# Patient Record
Sex: FEMALE | Race: WHITE | NOT HISPANIC OR LATINO | URBAN - METROPOLITAN AREA
[De-identification: names, ages, dates, MRNs, and addresses within clinical notes are randomized per-mention and may not be internally consistent; named-entity substitution may affect disease eponyms.]

---

## 2017-12-07 ENCOUNTER — OUTPATIENT (OUTPATIENT)
Dept: OUTPATIENT SERVICES | Facility: HOSPITAL | Age: 77
LOS: 1 days | End: 2017-12-07
Payer: MEDICARE

## 2017-12-07 VITALS
TEMPERATURE: 98 F | DIASTOLIC BLOOD PRESSURE: 79 MMHG | HEART RATE: 81 BPM | SYSTOLIC BLOOD PRESSURE: 135 MMHG | RESPIRATION RATE: 16 BRPM | WEIGHT: 121.92 LBS

## 2017-12-07 DIAGNOSIS — M17.11 UNILATERAL PRIMARY OSTEOARTHRITIS, RIGHT KNEE: ICD-10-CM

## 2017-12-07 DIAGNOSIS — Z98.890 OTHER SPECIFIED POSTPROCEDURAL STATES: Chronic | ICD-10-CM

## 2017-12-07 DIAGNOSIS — Z01.818 ENCOUNTER FOR OTHER PREPROCEDURAL EXAMINATION: ICD-10-CM

## 2017-12-07 DIAGNOSIS — Z90.5 ACQUIRED ABSENCE OF KIDNEY: Chronic | ICD-10-CM

## 2017-12-07 DIAGNOSIS — Z98.89 OTHER SPECIFIED POSTPROCEDURAL STATES: Chronic | ICD-10-CM

## 2017-12-07 LAB
ALBUMIN SERPL ELPH-MCNC: 3.7 G/DL — SIGNIFICANT CHANGE UP (ref 3.3–5)
ALP SERPL-CCNC: 148 U/L — HIGH (ref 40–120)
ALT FLD-CCNC: 40 U/L — SIGNIFICANT CHANGE UP (ref 12–78)
ANION GAP SERPL CALC-SCNC: 7 MMOL/L — SIGNIFICANT CHANGE UP (ref 5–17)
APTT BLD: 33.3 SEC — SIGNIFICANT CHANGE UP (ref 27.5–37.4)
AST SERPL-CCNC: 19 U/L — SIGNIFICANT CHANGE UP (ref 15–37)
BILIRUB SERPL-MCNC: 0.3 MG/DL — SIGNIFICANT CHANGE UP (ref 0.2–1.2)
BUN SERPL-MCNC: 34 MG/DL — HIGH (ref 7–23)
CALCIUM SERPL-MCNC: 9.7 MG/DL — SIGNIFICANT CHANGE UP (ref 8.5–10.1)
CHLORIDE SERPL-SCNC: 107 MMOL/L — SIGNIFICANT CHANGE UP (ref 96–108)
CO2 SERPL-SCNC: 26 MMOL/L — SIGNIFICANT CHANGE UP (ref 22–31)
CREAT SERPL-MCNC: 1.3 MG/DL — SIGNIFICANT CHANGE UP (ref 0.5–1.3)
GLUCOSE SERPL-MCNC: 93 MG/DL — SIGNIFICANT CHANGE UP (ref 70–99)
HCT VFR BLD CALC: 32.7 % — LOW (ref 34.5–45)
HGB BLD-MCNC: 10 G/DL — LOW (ref 11.5–15.5)
INR BLD: 1.1 RATIO — SIGNIFICANT CHANGE UP (ref 0.88–1.16)
MCHC RBC-ENTMCNC: 27.5 PG — SIGNIFICANT CHANGE UP (ref 27–34)
MCHC RBC-ENTMCNC: 30.7 GM/DL — LOW (ref 32–36)
MCV RBC AUTO: 89.7 FL — SIGNIFICANT CHANGE UP (ref 80–100)
PLATELET # BLD AUTO: 429 K/UL — HIGH (ref 150–400)
POTASSIUM SERPL-MCNC: 4.9 MMOL/L — SIGNIFICANT CHANGE UP (ref 3.5–5.3)
POTASSIUM SERPL-SCNC: 4.9 MMOL/L — SIGNIFICANT CHANGE UP (ref 3.5–5.3)
PROT SERPL-MCNC: 8.2 G/DL — SIGNIFICANT CHANGE UP (ref 6–8.3)
PROTHROM AB SERPL-ACNC: 12 SEC — SIGNIFICANT CHANGE UP (ref 9.8–12.7)
RBC # BLD: 3.65 M/UL — LOW (ref 3.8–5.2)
RBC # FLD: 13.3 % — SIGNIFICANT CHANGE UP (ref 10.3–14.5)
SODIUM SERPL-SCNC: 140 MMOL/L — SIGNIFICANT CHANGE UP (ref 135–145)
WBC # BLD: 9.5 K/UL — SIGNIFICANT CHANGE UP (ref 3.8–10.5)
WBC # FLD AUTO: 9.5 K/UL — SIGNIFICANT CHANGE UP (ref 3.8–10.5)

## 2017-12-07 PROCEDURE — 93010 ELECTROCARDIOGRAM REPORT: CPT | Mod: NC

## 2017-12-07 PROCEDURE — 73560 X-RAY EXAM OF KNEE 1 OR 2: CPT | Mod: 26,RT

## 2017-12-07 NOTE — H&P PST ADULT - RS GEN PE MLT RESP DETAILS PC
respirations non-labored/no rhonchi/no wheezes/no rales/clear to auscultation bilaterally good air movement/respirations non-labored/normal/breath sounds equal/airway patent/clear to auscultation bilaterally

## 2017-12-07 NOTE — H&P PST ADULT - HISTORY OF PRESENT ILLNESS
75 y/o female with hx of anemia since Dec. 2013.  Pt reports she was referred to Hematologist, who sent her for CT of abdomen/pelvis. Right renal massdiscovered.  Now scheduled for Right laparoscopic partial nephrectomy possible radical, possible open 11/19/14. 76yo female with PMH of HTN here for PST. Pt reports to having chronic right knee pain getting progressively worse since 4/2017. Pt states she can't fully extend her right knee due to tremendous pain. Pt states right knee pain is 10/10 with ambulating and no pain meds are helping. Pt is using cane to ambulate and wears a knee brace most of the time. Pt denies limping gait and recent falls. Pt diagnosed with OA of right knee. Pt electing for right total knee replacement on 12/20/17.

## 2017-12-07 NOTE — H&P PST ADULT - PROBLEM SELECTOR PLAN 2
Medical clearance needed.   CBC, Comprehensive panel, PT/PTT, T&S, EKG, Nose culture and X-ray of right knee ordered.   Pre-op instructions and 3 days of surgical scrubs given and pt verbalized understanding.

## 2017-12-07 NOTE — H&P PST ADULT - VENOUS THROMBOEMBOLISM CURRENT STATUS
present cancer or chemotherapy/major surgery lasting over 3 hrs elective major lower extremity arthroplasty

## 2017-12-07 NOTE — H&P PST ADULT - PSH
History of tonsillectomy  childhood History of tonsillectomy  childhood  S/P arthroscopic knee surgery  (Right, 1995)  S/p nephrectomy  (Right, 2014)

## 2017-12-07 NOTE — H&P PST ADULT - LYMPHATIC
anterior cervical R/supraclavicular R/posterior cervical R/supraclavicular L/posterior cervical L/anterior cervical L

## 2017-12-07 NOTE — H&P PST ADULT - ASSESSMENT
73 y/o female with hx of anemia since Dec. 2013.  Pt reports she was referred to Hematologist, who sent her for CT of abdomen/pelvis. Right renal mass discovered.  Now scheduled for Right laparoscopic partial nephrectomy possible radical, possible open 11/19/14. 78yo female with unilateral primary osteoarthritis, right knee.

## 2017-12-07 NOTE — H&P PST ADULT - MUSCULOSKELETAL
details… No joint pain, swelling or deformity; no limitation of movement detailed exam Right knee - decreased ROM, (+) tenderness to palpation to medial aspect, (+) mild edema, no erythema/no calf tenderness/decreased ROM due to pain/joint swelling/joint warmth

## 2017-12-07 NOTE — H&P PST ADULT - NSANTHOSAYNRD_GEN_A_CORE
No. SARMAD screening performed.  STOP BANG Legend: 0-2 = LOW Risk; 3-4 = INTERMEDIATE Risk; 5-8 = HIGH Risk

## 2017-12-07 NOTE — H&P PST ADULT - PMH
Hypertension    Hypothyroidism Hypertension    Hypothyroidism    Unilateral primary osteoarthritis, right knee

## 2017-12-08 LAB
MRSA PCR RESULT.: SIGNIFICANT CHANGE UP
S AUREUS DNA NOSE QL NAA+PROBE: SIGNIFICANT CHANGE UP

## 2017-12-19 PROCEDURE — 93005 ELECTROCARDIOGRAM TRACING: CPT

## 2017-12-19 PROCEDURE — 87640 STAPH A DNA AMP PROBE: CPT

## 2017-12-19 PROCEDURE — 86850 RBC ANTIBODY SCREEN: CPT

## 2017-12-19 PROCEDURE — 85730 THROMBOPLASTIN TIME PARTIAL: CPT

## 2017-12-19 PROCEDURE — 86900 BLOOD TYPING SEROLOGIC ABO: CPT

## 2017-12-19 PROCEDURE — 86920 COMPATIBILITY TEST SPIN: CPT

## 2017-12-19 PROCEDURE — 87641 MR-STAPH DNA AMP PROBE: CPT

## 2017-12-19 PROCEDURE — 85610 PROTHROMBIN TIME: CPT

## 2017-12-19 PROCEDURE — 73560 X-RAY EXAM OF KNEE 1 OR 2: CPT

## 2017-12-19 PROCEDURE — 85027 COMPLETE CBC AUTOMATED: CPT

## 2017-12-19 PROCEDURE — G0463: CPT

## 2017-12-19 PROCEDURE — 80053 COMPREHEN METABOLIC PANEL: CPT

## 2017-12-19 PROCEDURE — 86901 BLOOD TYPING SEROLOGIC RH(D): CPT

## 2017-12-19 RX ORDER — SODIUM CHLORIDE 9 MG/ML
1000 INJECTION, SOLUTION INTRAVENOUS
Qty: 0 | Refills: 0 | Status: DISCONTINUED | OUTPATIENT
Start: 2017-12-20 | End: 2017-12-20

## 2017-12-19 RX ORDER — HYDROMORPHONE HYDROCHLORIDE 2 MG/ML
4 INJECTION INTRAMUSCULAR; INTRAVENOUS; SUBCUTANEOUS EVERY 4 HOURS
Qty: 0 | Refills: 0 | Status: DISCONTINUED | OUTPATIENT
Start: 2017-12-20 | End: 2017-12-22

## 2017-12-19 RX ORDER — ONDANSETRON 8 MG/1
4 TABLET, FILM COATED ORAL EVERY 6 HOURS
Qty: 0 | Refills: 0 | Status: DISCONTINUED | OUTPATIENT
Start: 2017-12-20 | End: 2017-12-22

## 2017-12-19 RX ORDER — LEVOTHYROXINE SODIUM 125 MCG
88 TABLET ORAL DAILY
Qty: 0 | Refills: 0 | Status: DISCONTINUED | OUTPATIENT
Start: 2017-12-20 | End: 2017-12-22

## 2017-12-19 RX ORDER — ASCORBIC ACID 60 MG
500 TABLET,CHEWABLE ORAL
Qty: 0 | Refills: 0 | Status: DISCONTINUED | OUTPATIENT
Start: 2017-12-20 | End: 2017-12-22

## 2017-12-19 RX ORDER — AMLODIPINE BESYLATE 2.5 MG/1
10 TABLET ORAL DAILY
Qty: 0 | Refills: 0 | Status: DISCONTINUED | OUTPATIENT
Start: 2017-12-20 | End: 2017-12-22

## 2017-12-19 RX ORDER — FOLIC ACID 0.8 MG
1 TABLET ORAL DAILY
Qty: 0 | Refills: 0 | Status: DISCONTINUED | OUTPATIENT
Start: 2017-12-20 | End: 2017-12-22

## 2017-12-19 RX ORDER — ACETAMINOPHEN 500 MG
1000 TABLET ORAL ONCE
Qty: 0 | Refills: 0 | Status: COMPLETED | OUTPATIENT
Start: 2017-12-20 | End: 2017-12-20

## 2017-12-19 RX ORDER — ASPIRIN/CALCIUM CARB/MAGNESIUM 324 MG
325 TABLET ORAL
Qty: 0 | Refills: 0 | Status: DISCONTINUED | OUTPATIENT
Start: 2017-12-20 | End: 2017-12-22

## 2017-12-19 RX ORDER — CEFAZOLIN SODIUM 1 G
2000 VIAL (EA) INJECTION ONCE
Qty: 0 | Refills: 0 | Status: COMPLETED | OUTPATIENT
Start: 2017-12-20 | End: 2017-12-20

## 2017-12-19 RX ORDER — MORPHINE SULFATE 50 MG/1
2 CAPSULE, EXTENDED RELEASE ORAL EVERY 4 HOURS
Qty: 0 | Refills: 0 | Status: DISCONTINUED | OUTPATIENT
Start: 2017-12-20 | End: 2017-12-22

## 2017-12-19 RX ORDER — HYDROMORPHONE HYDROCHLORIDE 2 MG/ML
2 INJECTION INTRAMUSCULAR; INTRAVENOUS; SUBCUTANEOUS EVERY 4 HOURS
Qty: 0 | Refills: 0 | Status: DISCONTINUED | OUTPATIENT
Start: 2017-12-20 | End: 2017-12-22

## 2017-12-19 RX ORDER — PANTOPRAZOLE SODIUM 20 MG/1
40 TABLET, DELAYED RELEASE ORAL DAILY
Qty: 0 | Refills: 0 | Status: DISCONTINUED | OUTPATIENT
Start: 2017-12-20 | End: 2017-12-22

## 2017-12-19 RX ORDER — ACETAMINOPHEN 500 MG
650 TABLET ORAL EVERY 8 HOURS
Qty: 0 | Refills: 0 | Status: DISCONTINUED | OUTPATIENT
Start: 2017-12-21 | End: 2017-12-22

## 2017-12-19 RX ORDER — FERROUS SULFATE 325(65) MG
325 TABLET ORAL
Qty: 0 | Refills: 0 | Status: DISCONTINUED | OUTPATIENT
Start: 2017-12-20 | End: 2017-12-22

## 2017-12-19 RX ORDER — DOCUSATE SODIUM 100 MG
100 CAPSULE ORAL THREE TIMES A DAY
Qty: 0 | Refills: 0 | Status: DISCONTINUED | OUTPATIENT
Start: 2017-12-20 | End: 2017-12-22

## 2017-12-20 ENCOUNTER — TRANSCRIPTION ENCOUNTER (OUTPATIENT)
Age: 77
End: 2017-12-20

## 2017-12-20 ENCOUNTER — RESULT REVIEW (OUTPATIENT)
Age: 77
End: 2017-12-20

## 2017-12-20 ENCOUNTER — INPATIENT (INPATIENT)
Facility: HOSPITAL | Age: 77
LOS: 1 days | Discharge: NURSING, REHAB OR LONG-TERM CARE | DRG: 470 | End: 2017-12-22
Attending: ORTHOPAEDIC SURGERY | Admitting: ORTHOPAEDIC SURGERY
Payer: MEDICARE

## 2017-12-20 VITALS
WEIGHT: 121.92 LBS | TEMPERATURE: 98 F | HEART RATE: 82 BPM | DIASTOLIC BLOOD PRESSURE: 72 MMHG | SYSTOLIC BLOOD PRESSURE: 157 MMHG | OXYGEN SATURATION: 100 % | RESPIRATION RATE: 15 BRPM

## 2017-12-20 DIAGNOSIS — M17.11 UNILATERAL PRIMARY OSTEOARTHRITIS, RIGHT KNEE: ICD-10-CM

## 2017-12-20 DIAGNOSIS — Z98.89 OTHER SPECIFIED POSTPROCEDURAL STATES: Chronic | ICD-10-CM

## 2017-12-20 DIAGNOSIS — Z90.5 ACQUIRED ABSENCE OF KIDNEY: Chronic | ICD-10-CM

## 2017-12-20 DIAGNOSIS — Z98.890 OTHER SPECIFIED POSTPROCEDURAL STATES: Chronic | ICD-10-CM

## 2017-12-20 LAB
ABO RH CONFIRMATION: SIGNIFICANT CHANGE UP
HCT VFR BLD CALC: 28.4 % — LOW (ref 34.5–45)
HGB BLD-MCNC: 8.7 G/DL — LOW (ref 11.5–15.5)

## 2017-12-20 PROCEDURE — 88311 DECALCIFY TISSUE: CPT | Mod: 26

## 2017-12-20 PROCEDURE — 88305 TISSUE EXAM BY PATHOLOGIST: CPT | Mod: 26

## 2017-12-20 PROCEDURE — 73562 X-RAY EXAM OF KNEE 3: CPT | Mod: 26,RT

## 2017-12-20 RX ORDER — BUPIVACAINE 13.3 MG/ML
20 INJECTION, SUSPENSION, LIPOSOMAL INFILTRATION ONCE
Qty: 0 | Refills: 0 | Status: DISCONTINUED | OUTPATIENT
Start: 2017-12-20 | End: 2017-12-20

## 2017-12-20 RX ORDER — OXYCODONE HYDROCHLORIDE 5 MG/1
10 TABLET ORAL EVERY 6 HOURS
Qty: 0 | Refills: 0 | Status: DISCONTINUED | OUTPATIENT
Start: 2017-12-20 | End: 2017-12-20

## 2017-12-20 RX ORDER — CEFAZOLIN SODIUM 1 G
2000 VIAL (EA) INJECTION EVERY 8 HOURS
Qty: 0 | Refills: 0 | Status: COMPLETED | OUTPATIENT
Start: 2017-12-20 | End: 2017-12-20

## 2017-12-20 RX ORDER — SODIUM CHLORIDE 9 MG/ML
1000 INJECTION, SOLUTION INTRAVENOUS
Qty: 0 | Refills: 0 | Status: DISCONTINUED | OUTPATIENT
Start: 2017-12-20 | End: 2017-12-21

## 2017-12-20 RX ORDER — SODIUM CHLORIDE 9 MG/ML
1000 INJECTION, SOLUTION INTRAVENOUS
Qty: 0 | Refills: 0 | Status: DISCONTINUED | OUTPATIENT
Start: 2017-12-20 | End: 2017-12-20

## 2017-12-20 RX ORDER — OXYCODONE HYDROCHLORIDE 5 MG/1
5 TABLET ORAL EVERY 4 HOURS
Qty: 0 | Refills: 0 | Status: DISCONTINUED | OUTPATIENT
Start: 2017-12-20 | End: 2017-12-20

## 2017-12-20 RX ORDER — ACETAMINOPHEN 500 MG
1000 TABLET ORAL ONCE
Qty: 0 | Refills: 0 | Status: COMPLETED | OUTPATIENT
Start: 2017-12-21 | End: 2017-12-21

## 2017-12-20 RX ORDER — ACETAMINOPHEN 500 MG
1000 TABLET ORAL ONCE
Qty: 0 | Refills: 0 | Status: COMPLETED | OUTPATIENT
Start: 2017-12-20 | End: 2017-12-20

## 2017-12-20 RX ORDER — LEVOTHYROXINE SODIUM 125 MCG
1 TABLET ORAL
Qty: 0 | Refills: 0 | COMMUNITY

## 2017-12-20 RX ORDER — HYDROMORPHONE HYDROCHLORIDE 2 MG/ML
0.5 INJECTION INTRAMUSCULAR; INTRAVENOUS; SUBCUTANEOUS
Qty: 0 | Refills: 0 | Status: DISCONTINUED | OUTPATIENT
Start: 2017-12-20 | End: 2017-12-20

## 2017-12-20 RX ORDER — AMLODIPINE BESYLATE 2.5 MG/1
1 TABLET ORAL
Qty: 0 | Refills: 0 | COMMUNITY

## 2017-12-20 RX ADMIN — HYDROMORPHONE HYDROCHLORIDE 0.5 MILLIGRAM(S): 2 INJECTION INTRAMUSCULAR; INTRAVENOUS; SUBCUTANEOUS at 11:06

## 2017-12-20 RX ADMIN — Medication 100 MILLIGRAM(S): at 14:42

## 2017-12-20 RX ADMIN — Medication 500 MILLIGRAM(S): at 18:21

## 2017-12-20 RX ADMIN — Medication 325 MILLIGRAM(S): at 18:21

## 2017-12-20 RX ADMIN — Medication 100 MILLIGRAM(S): at 21:24

## 2017-12-20 RX ADMIN — SODIUM CHLORIDE 100 MILLILITER(S): 9 INJECTION, SOLUTION INTRAVENOUS at 10:47

## 2017-12-20 RX ADMIN — Medication 400 MILLIGRAM(S): at 16:36

## 2017-12-20 RX ADMIN — HYDROMORPHONE HYDROCHLORIDE 0.5 MILLIGRAM(S): 2 INJECTION INTRAMUSCULAR; INTRAVENOUS; SUBCUTANEOUS at 10:44

## 2017-12-20 RX ADMIN — HYDROMORPHONE HYDROCHLORIDE 0.5 MILLIGRAM(S): 2 INJECTION INTRAMUSCULAR; INTRAVENOUS; SUBCUTANEOUS at 10:25

## 2017-12-20 RX ADMIN — SODIUM CHLORIDE 75 MILLILITER(S): 9 INJECTION, SOLUTION INTRAVENOUS at 07:06

## 2017-12-20 RX ADMIN — HYDROMORPHONE HYDROCHLORIDE 4 MILLIGRAM(S): 2 INJECTION INTRAMUSCULAR; INTRAVENOUS; SUBCUTANEOUS at 14:46

## 2017-12-20 RX ADMIN — HYDROMORPHONE HYDROCHLORIDE 4 MILLIGRAM(S): 2 INJECTION INTRAMUSCULAR; INTRAVENOUS; SUBCUTANEOUS at 15:45

## 2017-12-20 RX ADMIN — Medication 100 MILLIGRAM(S): at 16:03

## 2017-12-20 RX ADMIN — Medication 1000 MILLIGRAM(S): at 17:30

## 2017-12-20 RX ADMIN — HYDROMORPHONE HYDROCHLORIDE 0.5 MILLIGRAM(S): 2 INJECTION INTRAMUSCULAR; INTRAVENOUS; SUBCUTANEOUS at 10:45

## 2017-12-20 NOTE — PHYSICAL THERAPY INITIAL EVALUATION ADULT - GAIT TRAINING, PT EVAL
Pt will be supervision level of assistance for ambulation with appropriate assistive device for 55 feet in 3-5 sessions

## 2017-12-20 NOTE — PHYSICAL THERAPY INITIAL EVALUATION ADULT - CRITERIA FOR SKILLED THERAPEUTIC INTERVENTIONS
risk reduction/prevention/anticipated discharge recommendation/functional limitations in following categories/therapy frequency/rehab potential/impairments found

## 2017-12-20 NOTE — PHYSICAL THERAPY INITIAL EVALUATION ADULT - RANGE OF MOTION EXAMINATION, REHAB EVAL
R knee flex about 55, lacking 10 degrees of extension/bilateral upper extremity ROM was WFL (within functional limits)/deficits as listed below/Left LE ROM was WFL (within functional limits)

## 2017-12-20 NOTE — BRIEF OPERATIVE NOTE - PROCEDURE
<<-----Click on this checkbox to enter Procedure TKR (total knee replacement)  12/20/2017  RIGHT  Active  HNOOROLLA

## 2017-12-20 NOTE — PROGRESS NOTE ADULT - SUBJECTIVE AND OBJECTIVE BOX
Orthopedics Post-op Check    POD 0 R Total Knee Arthroplasty  Pt Seen and Examined. Pain is controlled. Feeling well overall. No nausea or vomiting.    Vital Signs Last 24 Hrs  T(C): 36.4 (12-20-17 @ 10:36), Max: 36.9 (12-20-17 @ 06:49)  T(F): 97.5 (12-20-17 @ 10:36), Max: 98.4 (12-20-17 @ 06:49)  HR: 77 (12-20-17 @ 11:06) (68 - 82)  BP: 161/69 (12-20-17 @ 11:06) (136/67 - 166/70)  BP(mean): --  RR: 9 (12-20-17 @ 11:06) (9 - 16)  SpO2: 100% (12-20-17 @ 11:06) (100% - 100%)                        8.7    x     )-----------( x        ( 20 Dec 2017 10:25 )             28.4               Exam:  NAD AAOx3  Dressing clean and dry  +EHL FHL TA GS  SILT toes 1-5  +DP  Calf Soft NT    A/P:   77yFemale s/p R Total Knee Arthroplasty  -Pain control  -DVT/PE ppx  - WBAT/PT/OOB  -Med Mgmt  - FU Labs  - D/C Planning

## 2017-12-20 NOTE — PHYSICAL THERAPY INITIAL EVALUATION ADULT - ADDITIONAL COMMENTS
Pt reports she is from alone, 3 steps to enter. Pt lives in a split lever home. Friend will provide necessary assist. PLOF independent with ambulation using RW.

## 2017-12-20 NOTE — PHYSICAL THERAPY INITIAL EVALUATION ADULT - TRANSFER TRAINING, PT EVAL
Pt will be supervision level of assistance for sit to stand/ stand to sit transfers with appropriate AD in 3-5 sessions

## 2017-12-20 NOTE — BRIEF OPERATIVE NOTE - POST-OP DX
Osteoarthritis of right knee, unspecified osteoarthritis type  12/20/2017    Active  Harmony Sepulveda

## 2017-12-20 NOTE — PATIENT PROFILE ADULT. - PSH
History of tonsillectomy  childhood  S/P arthroscopic knee surgery  (Right, 1995)  S/p nephrectomy  (Right, 2014)

## 2017-12-21 ENCOUNTER — TRANSCRIPTION ENCOUNTER (OUTPATIENT)
Age: 77
End: 2017-12-21

## 2017-12-21 DIAGNOSIS — D50.0 IRON DEFICIENCY ANEMIA SECONDARY TO BLOOD LOSS (CHRONIC): ICD-10-CM

## 2017-12-21 DIAGNOSIS — I10 ESSENTIAL (PRIMARY) HYPERTENSION: ICD-10-CM

## 2017-12-21 DIAGNOSIS — E03.9 HYPOTHYROIDISM, UNSPECIFIED: ICD-10-CM

## 2017-12-21 LAB
ANION GAP SERPL CALC-SCNC: 5 MMOL/L — SIGNIFICANT CHANGE UP (ref 5–17)
BUN SERPL-MCNC: 17 MG/DL — SIGNIFICANT CHANGE UP (ref 7–23)
CALCIUM SERPL-MCNC: 8.9 MG/DL — SIGNIFICANT CHANGE UP (ref 8.5–10.1)
CHLORIDE SERPL-SCNC: 105 MMOL/L — SIGNIFICANT CHANGE UP (ref 96–108)
CO2 SERPL-SCNC: 29 MMOL/L — SIGNIFICANT CHANGE UP (ref 22–31)
CREAT SERPL-MCNC: 1.1 MG/DL — SIGNIFICANT CHANGE UP (ref 0.5–1.3)
GLUCOSE SERPL-MCNC: 125 MG/DL — HIGH (ref 70–99)
HCT VFR BLD CALC: 27.9 % — LOW (ref 34.5–45)
HCT VFR BLD CALC: 29.2 % — LOW (ref 34.5–45)
HGB BLD-MCNC: 8.6 G/DL — LOW (ref 11.5–15.5)
HGB BLD-MCNC: 8.9 G/DL — LOW (ref 11.5–15.5)
POTASSIUM SERPL-MCNC: 4.4 MMOL/L — SIGNIFICANT CHANGE UP (ref 3.5–5.3)
POTASSIUM SERPL-SCNC: 4.4 MMOL/L — SIGNIFICANT CHANGE UP (ref 3.5–5.3)
SODIUM SERPL-SCNC: 139 MMOL/L — SIGNIFICANT CHANGE UP (ref 135–145)

## 2017-12-21 RX ORDER — CELECOXIB 200 MG/1
200 CAPSULE ORAL
Qty: 0 | Refills: 0 | Status: DISCONTINUED | OUTPATIENT
Start: 2017-12-21 | End: 2017-12-22

## 2017-12-21 RX ADMIN — Medication 1000 MILLIGRAM(S): at 02:30

## 2017-12-21 RX ADMIN — Medication 1 TABLET(S): at 12:31

## 2017-12-21 RX ADMIN — Medication 100 MILLIGRAM(S): at 21:55

## 2017-12-21 RX ADMIN — HYDROMORPHONE HYDROCHLORIDE 4 MILLIGRAM(S): 2 INJECTION INTRAMUSCULAR; INTRAVENOUS; SUBCUTANEOUS at 12:33

## 2017-12-21 RX ADMIN — Medication 100 MILLIGRAM(S): at 05:56

## 2017-12-21 RX ADMIN — Medication 88 MICROGRAM(S): at 05:56

## 2017-12-21 RX ADMIN — PANTOPRAZOLE SODIUM 40 MILLIGRAM(S): 20 TABLET, DELAYED RELEASE ORAL at 12:30

## 2017-12-21 RX ADMIN — Medication 1 MILLIGRAM(S): at 12:31

## 2017-12-21 RX ADMIN — Medication 325 MILLIGRAM(S): at 17:48

## 2017-12-21 RX ADMIN — AMLODIPINE BESYLATE 10 MILLIGRAM(S): 2.5 TABLET ORAL at 05:56

## 2017-12-21 RX ADMIN — Medication 500 MILLIGRAM(S): at 05:56

## 2017-12-21 RX ADMIN — HYDROMORPHONE HYDROCHLORIDE 2 MILLIGRAM(S): 2 INJECTION INTRAMUSCULAR; INTRAVENOUS; SUBCUTANEOUS at 14:36

## 2017-12-21 RX ADMIN — Medication 325 MILLIGRAM(S): at 12:31

## 2017-12-21 RX ADMIN — Medication 650 MILLIGRAM(S): at 14:36

## 2017-12-21 RX ADMIN — Medication 650 MILLIGRAM(S): at 15:30

## 2017-12-21 RX ADMIN — HYDROMORPHONE HYDROCHLORIDE 4 MILLIGRAM(S): 2 INJECTION INTRAMUSCULAR; INTRAVENOUS; SUBCUTANEOUS at 11:15

## 2017-12-21 RX ADMIN — Medication 325 MILLIGRAM(S): at 08:24

## 2017-12-21 RX ADMIN — HYDROMORPHONE HYDROCHLORIDE 2 MILLIGRAM(S): 2 INJECTION INTRAMUSCULAR; INTRAVENOUS; SUBCUTANEOUS at 15:30

## 2017-12-21 RX ADMIN — Medication 650 MILLIGRAM(S): at 21:55

## 2017-12-21 RX ADMIN — Medication 650 MILLIGRAM(S): at 06:50

## 2017-12-21 RX ADMIN — Medication 100 MILLIGRAM(S): at 14:36

## 2017-12-21 RX ADMIN — Medication 500 MILLIGRAM(S): at 17:47

## 2017-12-21 RX ADMIN — Medication 650 MILLIGRAM(S): at 05:56

## 2017-12-21 RX ADMIN — Medication 400 MILLIGRAM(S): at 01:46

## 2017-12-21 RX ADMIN — Medication 325 MILLIGRAM(S): at 05:56

## 2017-12-21 RX ADMIN — HYDROMORPHONE HYDROCHLORIDE 2 MILLIGRAM(S): 2 INJECTION INTRAMUSCULAR; INTRAVENOUS; SUBCUTANEOUS at 20:42

## 2017-12-21 RX ADMIN — HYDROMORPHONE HYDROCHLORIDE 2 MILLIGRAM(S): 2 INJECTION INTRAMUSCULAR; INTRAVENOUS; SUBCUTANEOUS at 21:40

## 2017-12-21 RX ADMIN — HYDROMORPHONE HYDROCHLORIDE 2 MILLIGRAM(S): 2 INJECTION INTRAMUSCULAR; INTRAVENOUS; SUBCUTANEOUS at 02:48

## 2017-12-21 RX ADMIN — Medication 325 MILLIGRAM(S): at 17:47

## 2017-12-21 RX ADMIN — HYDROMORPHONE HYDROCHLORIDE 2 MILLIGRAM(S): 2 INJECTION INTRAMUSCULAR; INTRAVENOUS; SUBCUTANEOUS at 01:54

## 2017-12-21 NOTE — DISCHARGE NOTE ADULT - HOSPITAL COURSE
The patient is a 77 year old Female status post elective Total Knee Arthroplasty to the right knee after failing outpatient non-operative conservative management.  Patient presented to [facility] after being medically cleared for an elective surgical procedure. The patient was taken to the operating room on date mentioned above. Prophylactic antibiotics were started before the procedure and continued for 24 hours.  There were no complications during the procedure and patient tolerated the procedure well.  The patient was transferred to recovery room in stable condition and subsequently to surgical floor.  Patient was placed on ASA for anticoagulation.  All home medications were continued.  The patient received physical therapy daily and daily labs were followed.  The dressing was kept clean, dry, intact.  The rest of the hospital stay was unremarkable.

## 2017-12-21 NOTE — PROGRESS NOTE ADULT - SUBJECTIVE AND OBJECTIVE BOX
DEPARTMENT OF ANESTHESIA  POST ANESTHETIC EVALUATION    The Patient was interviewed and evaluated    Vital Signs Last 24 Hrs  T(C): 36.7 (21 Dec 2017 07:16), Max: 37.2 (21 Dec 2017 05:32)  T(F): 98.1 (21 Dec 2017 07:16), Max: 99 (21 Dec 2017 05:32)  HR: 78 (21 Dec 2017 07:16) (68 - 90)  BP: 144/79 (21 Dec 2017 07:16) (132/71 - 166/70)  BP(mean): --  RR: 16 (21 Dec 2017 07:16) (9 - 17)  SpO2: 97% (21 Dec 2017 07:16) (94% - 100%)    Evaluation:      (x ) No apparent complications or complaints regarding anesthesia care at this time  (x ) All questions were answered    Condition:  (x ) Stable      ( ) Guarded      ( ) Critical    Recommendations:  (x ) None     ( ) Other:

## 2017-12-21 NOTE — DISCHARGE NOTE ADULT - CARE PLAN
Principal Discharge DX:	Unilateral primary osteoarthritis, right knee  Goal:	Return To ADL's  Instructions for follow-up, activity and diet:	1.	Pain Control  2.	Walking with full weight bearing as tolerated, with assistive devices (walker/Cane as Needed)  3.	DVT Prophylaxis for 30 days, ASA 325mg BID as per med rec  4.	PT as needed  5.	Follow up with Dr. Snow as Outpatient in 10-14 Days after Discharge from the Hospital or Rehab. Call Office For Appointment.  6.	Remove Staples Post-Op Day 14, and Remove Dressing Post-Op Day 10, with Daily Dressing Changes as Need.  7.	Ice affected area as Needed  8.	Keep Dressing  Clean and dry.

## 2017-12-21 NOTE — DISCHARGE NOTE ADULT - PLAN OF CARE
Return To ADL's 1.	Pain Control  2.	Walking with full weight bearing as tolerated, with assistive devices (walker/Cane as Needed)  3.	DVT Prophylaxis for 30 days, ASA 325mg BID as per med rec  4.	PT as needed  5.	Follow up with Dr. Snow as Outpatient in 10-14 Days after Discharge from the Hospital or Rehab. Call Office For Appointment.  6.	Remove Staples Post-Op Day 14, and Remove Dressing Post-Op Day 10, with Daily Dressing Changes as Need.  7.	Ice affected area as Needed  8.	Keep Dressing  Clean and dry.

## 2017-12-21 NOTE — CONSULT NOTE ADULT - SUBJECTIVE AND OBJECTIVE BOX
Patient is a 77y old  Female who presents with a chief complaint of "Right knee replacement" (20 Dec 2017 06:56)  feels well, no complaints at present time      INTERVAL HPI/OVERNIGHT EVENTS:  T(C): 36.7 (12-21-17 @ 07:16), Max: 37.2 (12-21-17 @ 05:32)  HR: 78 (12-21-17 @ 07:16) (75 - 90)  BP: 144/79 (12-21-17 @ 07:16) (132/71 - 161/70)  RR: 16 (12-21-17 @ 07:16) (16 - 17)  SpO2: 97% (12-21-17 @ 07:16) (94% - 100%)  Wt(kg): --  I&O's Summary    20 Dec 2017 07:01  -  21 Dec 2017 07:00  --------------------------------------------------------  IN: 162 mL / OUT: 1650 mL / NET: -1488 mL        PAST MEDICAL & SURGICAL HISTORY:  Unilateral primary osteoarthritis, right knee  Hypothyroidism  Hypertension  S/P arthroscopic knee surgery: (Right, 1995)  S/p nephrectomy: (Right, 2014)  History of tonsillectomy: childhood      SOCIAL HISTORY  Alcohol: denies  Tobacco: denies  Illicit substance use:  denies    FAMILY HISTORY:  fam hx of CAD    Home Medications:  amLODIPine 10 mg oral tablet: 1 tab(s) orally once a day (20 Dec 2017 06:49)  levothyroxine 88 mcg (0.088 mg) oral tablet: 1 tab(s) orally once a day (20 Dec 2017 06:49)        LABS:                        8.6    x     )-----------( x        ( 21 Dec 2017 04:40 )             27.9     12-21    139  |  105  |  17  ----------------------------<  125<H>  4.4   |  29  |  1.10    Ca    8.9      21 Dec 2017 04:40          MEDICATIONS  (STANDING):  acetaminophen   Tablet. 650 milliGRAM(s) Oral every 8 hours  amLODIPine   Tablet 10 milliGRAM(s) Oral daily  ascorbic acid 500 milliGRAM(s) Oral two times a day  aspirin enteric coated 325 milliGRAM(s) Oral two times a day  docusate sodium 100 milliGRAM(s) Oral three times a day  ferrous    sulfate 325 milliGRAM(s) Oral three times a day with meals  folic acid 1 milliGRAM(s) Oral daily  levothyroxine 88 MICROGram(s) Oral daily  multivitamin 1 Tablet(s) Oral daily  pantoprazole    Tablet 40 milliGRAM(s) Oral daily    MEDICATIONS  (PRN):  HYDROmorphone   Tablet 2 milliGRAM(s) Oral every 4 hours PRN Moderate Pain (4 - 6)  HYDROmorphone   Tablet 4 milliGRAM(s) Oral every 4 hours PRN Severe Pain (7 - 10)  morphine  - Injectable 2 milliGRAM(s) IV Push every 4 hours PRN Severe Pain (7 - 10)  ondansetron Injectable 4 milliGRAM(s) IV Push every 6 hours PRN Nausea and/or Vomiting      REVIEW OF SYSTEMS:  CONSTITUTIONAL: No fever, weight loss, or fatigue  EYES: No eye pain, visual disturbances, or discharge  ENMT:  No difficulty hearing, tinnitus, vertigo; No sinus or throat pain  NECK: No pain or stiffness  RESPIRATORY: No cough, wheezing, chills or hemoptysis; No shortness of breath  CARDIOVASCULAR: No chest pain, palpitations, dizziness, or leg swelling  GASTROINTESTINAL: No abdominal or epigastric pain. No nausea, vomiting, or hematemesis; No diarrhea or constipation. No melena or hematochezia.  GENITOURINARY: No dysuria, frequency, hematuria, or incontinence  NEUROLOGICAL: No headaches, memory loss, loss of strength, numbness, or tremors  SKIN: No itching, burning, rashes, or lesions   LYMPH NODES: No enlarged glands  ENDOCRINE: No heat or cold intolerance; No hair loss  MUSCULOSKELETAL: chronic right knee pain  PSYCHIATRIC: No depression, anxiety, mood swings, or difficulty sleeping  HEME/LYMPH: No easy bruising, or bleeding gums  ALLERY AND IMMUNOLOGIC: No hives or eczema    RADIOLOGY & ADDITIONAL TESTS:    Imaging Personally Reviewed:  [ ] YES  [ ] NO    Consultant(s) Notes Reviewed:  [ ] YES  [ ] NO        PHYSICAL EXAM:  GENERAL: NAD, well-groomed, well-developed  HEAD:  Atraumatic, Normocephalic  EYES: EOMI, PERRLA, conjunctiva and sclera clear  ENMT: No tonsillar erythema, exudates, or enlargement; Moist mucous membranes, Good dentition, No lesions  NECK: Supple, No JVD, Normal thyroid  NERVOUS SYSTEM:  Alert & Oriented X3, Good concentration; Motor Strength 5/5 B/L upper and lower extremities; DTRs 2+ intact and symmetric  CHEST/LUNG: Clear to percussion bilaterally; No rales, rhonchi, wheezing, or rubs  HEART: Regular rate and rhythm; No murmurs, rubs, or gallops  ABDOMEN: Soft, Nontender, Nondistended; Bowel sounds present  EXTREMITIES:  2+ Peripheral Pulses, No clubbing, cyanosis, or edema  LYMPH: No lymphadenopathy noted  SKIN: No rashes or lesions    Care Discussed with Consultants/Other Providers [x ] YES  [ ] NO

## 2017-12-21 NOTE — PROGRESS NOTE ADULT - SUBJECTIVE AND OBJECTIVE BOX
NUSRATLOLY GARNER 77y Female  MRN-[id number]     ORTHOPEDIC SURGERY / DR. AVERY    NO SOB, CP, PALPITATION, WEAKNESS DOZINESS, TOLERATED AMBULATION     POD # 1    Vital Signs Last 24 Hrs  T(C): 37.2 (21 Dec 2017 05:32), Max: 37.2 (21 Dec 2017 05:32)  T(F): 99 (21 Dec 2017 05:32), Max: 99 (21 Dec 2017 05:32)  HR: 84 (21 Dec 2017 05:32) (68 - 90)  BP: 161/70 (21 Dec 2017 05:32) (132/71 - 166/70)  BP(mean): --  RR: 16 (21 Dec 2017 05:32) (9 - 17)  SpO2: 94% (21 Dec 2017 05:32) (94% - 100%)     RIGHT KNEE :    DRESSING DRY AND INTACT  SOME EDEMA  GOOD MOTOR TO RIGHT LOWER EXTREMITY  NEURO-VASCULAR STATUS INTACT  NO CALF TENDERNESS    Hemoglobin: 8.6 (12-21 @ 04:40)  Hemoglobin: 8.7 (12-20 @ 10:25)    Hematocrit: 27.9 (12-21 @ 04:40)  Hematocrit: 28.4 (12-20 @ 10:25)    12-21    139  |  105  |  17  ----------------------------<  125<H>  4.4   |  29  |  1.10    Ca    8.9      21 Dec 2017 04:40    ASSESSMENT &  PLAN:  POD # 1 S/P RIGHT TOTAL KNEE  REPLACEMENT    WEIGHT  BEARING AS TOLERATED, OOB AND AMBULATE, PHYSICAL THERAPY   DVT PROPHYLAXIS  MG PO BID  INCENTIVE SPIROMETRY     H/O ANEMIA, ANEMIA DUE TO POST OP ACUTE BLOOD  LOSS  H/O ANEMIA, VITALS ARE STABLE, ASYMPTOMATIC   OBSERVE FOR NOW, REPEAT H/H  AT 1 PM TODAY IF LOWER OR BECOMES SYMPTOMATIC WILL TRANSFUSE     DISCHARGE PLANNING TO HOME WITH HOME CARE

## 2017-12-21 NOTE — DISCHARGE NOTE ADULT - MEDICATION SUMMARY - MEDICATIONS TO TAKE
I will START or STAY ON the medications listed below when I get home from the hospital:    3 IN 1 COMMODE  -- DX : S/P RIGHT TKR  DATE OF SURGERY 12/20/2017  M 17.11  KEYANA 99 DAYS  WT 55.3 KG  HT  165.1CM  -- Indication: For Post op for total knee    ROLLING WALKER  -- DX : S/P RIGHT TKR  DATE OF SURGERY 12/20/2017  M 17.11  KEYANA 99 DAYS  WT 55.3 KG  HT  165.1CM  -- Indication: For Post op for total knee    Percocet 5/325 oral tablet  -- 1 tab(s) by mouth every 4 hours MDD:6 prn for pain  -- Caution federal law prohibits the transfer of this drug to any person other  than the person for whom it was prescribed.  May cause drowsiness.  Alcohol may intensify this effect.  Use care when operating dangerous machinery.  This prescription cannot be refilled.  This product contains acetaminophen.  Do not use  with any other product containing acetaminophen to prevent possible liver damage.  Using more of this medication than prescribed may cause serious breathing problems.    -- Indication: For Prn for pain    Ecotrin 325 mg oral delayed release tablet  -- 1 tab(s) by mouth 2 times a day for dvt ppx MDD:2  -- Swallow whole.  Do not crush.  Take with food or milk.    -- Indication: For For dvt ppx, do not skip doses    amLODIPine 10 mg oral tablet  -- 1 tab(s) by mouth once a day  -- Indication: For Prior oxana emed    Colace 100 mg oral capsule  -- 1 cap(s) by mouth 3 times a day prn for constipation   -- Medication should be taken with plenty of water.    -- Indication: For Prn for constipation    levothyroxine 88 mcg (0.088 mg) oral tablet  -- 1 tab(s) by mouth once a day  -- Indication: For Prior home med

## 2017-12-21 NOTE — DISCHARGE NOTE ADULT - PATIENT PORTAL LINK FT
“You can access the [facility] Patient Portal, offered by [facility], by registering with the following website: [email or web address]”

## 2017-12-21 NOTE — DISCHARGE NOTE ADULT - CARE PROVIDER_API CALL
Demetrius Snow), Orthopaedic Surgery  [address] [place] [address]  Phone: [phone]  Fax: [phone]

## 2017-12-22 VITALS
SYSTOLIC BLOOD PRESSURE: 119 MMHG | HEART RATE: 82 BPM | TEMPERATURE: 98 F | DIASTOLIC BLOOD PRESSURE: 71 MMHG | RESPIRATION RATE: 16 BRPM | OXYGEN SATURATION: 98 %

## 2017-12-22 LAB
ANION GAP SERPL CALC-SCNC: 7 MMOL/L — SIGNIFICANT CHANGE UP (ref 5–17)
BUN SERPL-MCNC: 15 MG/DL — SIGNIFICANT CHANGE UP (ref 7–23)
CALCIUM SERPL-MCNC: 8.9 MG/DL — SIGNIFICANT CHANGE UP (ref 8.5–10.1)
CHLORIDE SERPL-SCNC: 104 MMOL/L — SIGNIFICANT CHANGE UP (ref 96–108)
CO2 SERPL-SCNC: 29 MMOL/L — SIGNIFICANT CHANGE UP (ref 22–31)
CREAT SERPL-MCNC: 1.1 MG/DL — SIGNIFICANT CHANGE UP (ref 0.5–1.3)
FERRITIN SERPL-MCNC: 145 NG/ML — SIGNIFICANT CHANGE UP (ref 15–150)
FOLATE SERPL-MCNC: 15.7 NG/ML — SIGNIFICANT CHANGE UP (ref 4.8–24.2)
GLUCOSE SERPL-MCNC: 110 MG/DL — HIGH (ref 70–99)
HCT VFR BLD CALC: 26.4 % — LOW (ref 34.5–45)
HGB BLD-MCNC: 8.6 G/DL — LOW (ref 11.5–15.5)
IRON SATN MFR SERPL: 13 UG/DL — LOW (ref 30–160)
IRON SATN MFR SERPL: 6 % — LOW (ref 14–50)
MCHC RBC-ENTMCNC: 28.1 PG — SIGNIFICANT CHANGE UP (ref 27–34)
MCHC RBC-ENTMCNC: 32.4 GM/DL — SIGNIFICANT CHANGE UP (ref 32–36)
MCV RBC AUTO: 86.6 FL — SIGNIFICANT CHANGE UP (ref 80–100)
PLATELET # BLD AUTO: 336 K/UL — SIGNIFICANT CHANGE UP (ref 150–400)
POTASSIUM SERPL-MCNC: 4.2 MMOL/L — SIGNIFICANT CHANGE UP (ref 3.5–5.3)
POTASSIUM SERPL-SCNC: 4.2 MMOL/L — SIGNIFICANT CHANGE UP (ref 3.5–5.3)
RBC # BLD: 3.05 M/UL — LOW (ref 3.8–5.2)
RBC # FLD: 14.1 % — SIGNIFICANT CHANGE UP (ref 10.3–14.5)
SODIUM SERPL-SCNC: 140 MMOL/L — SIGNIFICANT CHANGE UP (ref 135–145)
SURGICAL PATHOLOGY FINAL REPORT - CH: SIGNIFICANT CHANGE UP
TIBC SERPL-MCNC: 225 UG/DL — SIGNIFICANT CHANGE UP (ref 220–430)
UIBC SERPL-MCNC: 212 UG/DL — SIGNIFICANT CHANGE UP (ref 110–370)
VIT B12 SERPL-MCNC: 343 PG/ML — SIGNIFICANT CHANGE UP (ref 232–1245)
WBC # BLD: 8.8 K/UL — SIGNIFICANT CHANGE UP (ref 3.8–10.5)
WBC # FLD AUTO: 8.8 K/UL — SIGNIFICANT CHANGE UP (ref 3.8–10.5)

## 2017-12-22 PROCEDURE — 86920 COMPATIBILITY TEST SPIN: CPT

## 2017-12-22 PROCEDURE — C1889: CPT

## 2017-12-22 PROCEDURE — 83550 IRON BINDING TEST: CPT

## 2017-12-22 PROCEDURE — 85027 COMPLETE CBC AUTOMATED: CPT

## 2017-12-22 PROCEDURE — C1776: CPT

## 2017-12-22 PROCEDURE — 88305 TISSUE EXAM BY PATHOLOGIST: CPT

## 2017-12-22 PROCEDURE — 80048 BASIC METABOLIC PNL TOTAL CA: CPT

## 2017-12-22 PROCEDURE — 82728 ASSAY OF FERRITIN: CPT

## 2017-12-22 PROCEDURE — 85018 HEMOGLOBIN: CPT

## 2017-12-22 PROCEDURE — 97110 THERAPEUTIC EXERCISES: CPT

## 2017-12-22 PROCEDURE — 82607 VITAMIN B-12: CPT

## 2017-12-22 PROCEDURE — 82746 ASSAY OF FOLIC ACID SERUM: CPT

## 2017-12-22 PROCEDURE — 73562 X-RAY EXAM OF KNEE 3: CPT

## 2017-12-22 PROCEDURE — 97116 GAIT TRAINING THERAPY: CPT

## 2017-12-22 PROCEDURE — 88311 DECALCIFY TISSUE: CPT

## 2017-12-22 PROCEDURE — 97530 THERAPEUTIC ACTIVITIES: CPT

## 2017-12-22 PROCEDURE — 97161 PT EVAL LOW COMPLEX 20 MIN: CPT

## 2017-12-22 PROCEDURE — C1713: CPT

## 2017-12-22 RX ORDER — DOCUSATE SODIUM 100 MG
1 CAPSULE ORAL
Qty: 21 | Refills: 0 | OUTPATIENT
Start: 2017-12-22 | End: 2017-12-28

## 2017-12-22 RX ORDER — ASPIRIN/CALCIUM CARB/MAGNESIUM 324 MG
1 TABLET ORAL
Qty: 60 | Refills: 0 | OUTPATIENT
Start: 2017-12-22 | End: 2018-01-20

## 2017-12-22 RX ADMIN — Medication 100 MILLIGRAM(S): at 13:10

## 2017-12-22 RX ADMIN — Medication 500 MILLIGRAM(S): at 05:25

## 2017-12-22 RX ADMIN — Medication 650 MILLIGRAM(S): at 13:10

## 2017-12-22 RX ADMIN — Medication 325 MILLIGRAM(S): at 11:44

## 2017-12-22 RX ADMIN — HYDROMORPHONE HYDROCHLORIDE 2 MILLIGRAM(S): 2 INJECTION INTRAMUSCULAR; INTRAVENOUS; SUBCUTANEOUS at 04:27

## 2017-12-22 RX ADMIN — HYDROMORPHONE HYDROCHLORIDE 4 MILLIGRAM(S): 2 INJECTION INTRAMUSCULAR; INTRAVENOUS; SUBCUTANEOUS at 08:36

## 2017-12-22 RX ADMIN — Medication 88 MICROGRAM(S): at 05:25

## 2017-12-22 RX ADMIN — PANTOPRAZOLE SODIUM 40 MILLIGRAM(S): 20 TABLET, DELAYED RELEASE ORAL at 11:44

## 2017-12-22 RX ADMIN — Medication 650 MILLIGRAM(S): at 05:27

## 2017-12-22 RX ADMIN — CELECOXIB 200 MILLIGRAM(S): 200 CAPSULE ORAL at 09:00

## 2017-12-22 RX ADMIN — HYDROMORPHONE HYDROCHLORIDE 2 MILLIGRAM(S): 2 INJECTION INTRAMUSCULAR; INTRAVENOUS; SUBCUTANEOUS at 03:58

## 2017-12-22 RX ADMIN — Medication 650 MILLIGRAM(S): at 05:25

## 2017-12-22 RX ADMIN — Medication 1 MILLIGRAM(S): at 11:44

## 2017-12-22 RX ADMIN — HYDROMORPHONE HYDROCHLORIDE 4 MILLIGRAM(S): 2 INJECTION INTRAMUSCULAR; INTRAVENOUS; SUBCUTANEOUS at 09:05

## 2017-12-22 RX ADMIN — CELECOXIB 200 MILLIGRAM(S): 200 CAPSULE ORAL at 08:33

## 2017-12-22 RX ADMIN — Medication 100 MILLIGRAM(S): at 05:25

## 2017-12-22 RX ADMIN — Medication 325 MILLIGRAM(S): at 05:25

## 2017-12-22 RX ADMIN — Medication 1 TABLET(S): at 11:44

## 2017-12-22 RX ADMIN — Medication 325 MILLIGRAM(S): at 08:33

## 2017-12-22 NOTE — PROGRESS NOTE ADULT - SUBJECTIVE AND OBJECTIVE BOX
NUSRATLOLY GARNER 77y Female  MRN-[id number]     ORTHOPEDIC SURGERY / DR. AVERY    NO CP, SOB, PALPITATION, DIZZINESS    POD # 2    Vital Signs Last 24 Hrs  T(C): 36.7 (22 Dec 2017 04:53), Max: 37.2 (21 Dec 2017 05:32)  T(F): 98 (22 Dec 2017 04:53), Max: 99 (21 Dec 2017 05:32)  HR: 91 (22 Dec 2017 04:53) (78 - 93)  BP: 113/63 (22 Dec 2017 04:53) (106/65 - 161/70)  BP(mean): --  RR: 16 (22 Dec 2017 04:53) (16 - 16)  SpO2: 97% (22 Dec 2017 04:53) (94% - 97%)    RIGHT KNEE :    DRESSING DRY AND INTACT  SOME EDEMA  GOOD MOTOR TO RIGHT LOWER EXTREMITY  NEURO-VASCULAR STATUS INTACT  NO CALF TENDERNESS    Hemoglobin: 8.6 (12-22 @ 04:13)  Hemoglobin: 8.9 (12-21 @ 12:42)  Hemoglobin: 8.6 (12-21 @ 04:40)  Hemoglobin: 8.7 (12-20 @ 10:25)    Hematocrit: 26.4 (12-22 @ 04:13)  Hematocrit: 29.2 (12-21 @ 12:42)  Hematocrit: 27.9 (12-21 @ 04:40)  Hematocrit: 28.4 (12-20 @ 10:25)    12-22    140  |  104  |  15  ----------------------------<  110<H>  4.2   |  29  |  1.10    Ca    8.9      22 Dec 2017 04:13    ASSESSMENT &  PLAN:  POD # 2  S/P RIGHT TOTAL KNEE  REPLACEMENT    WEIGHT  BEARING AS TOLERATED, OOB AND AMBULATE, PHYSICAL THERAPY   DVT PROPHYLAXIS  MG PO BID  INCENTIVE SPIROMETRY     ANEMIA POST OP ACUTE BLOOD LOSS  ASYMPTOMATIC, TOLERATING AMBULATION   CONTINUE WITH IRON SUPPLEMENTS     DISCHARGE PLANNING TO HOME WITH HOME CARE

## 2017-12-22 NOTE — PROGRESS NOTE ADULT - SUBJECTIVE AND OBJECTIVE BOX
Patient is a 77y old  Female who presents with a chief complaint of "Right knee replacement" (21 Dec 2017 16:06)      INTERVAL HPI/OVERNIGHT EVENTS:  T(C): 36.6 (12-22-17 @ 07:49), Max: 37 (12-21-17 @ 15:39)  HR: 88 (12-22-17 @ 07:49) (84 - 93)  BP: 116/67 (12-22-17 @ 07:49) (106/65 - 132/69)  RR: 18 (12-22-17 @ 07:49) (16 - 18)  SpO2: 97% (12-22-17 @ 07:49) (94% - 97%)  Wt(kg): --  I&O's Summary    21 Dec 2017 07:01  -  22 Dec 2017 07:00  --------------------------------------------------------  IN: 360 mL / OUT: 0 mL / NET: 360 mL        PAST MEDICAL & SURGICAL HISTORY:  Unilateral primary osteoarthritis, right knee  Hypothyroidism  Hypertension  S/P arthroscopic knee surgery: (Right, 1995)  S/p nephrectomy: (Right, 2014)  History of tonsillectomy: childhood      SOCIAL HISTORY  Alcohol:  Tobacco:  Illicit substance use:      FAMILY HISTORY:      LABS:                        8.6    8.8   )-----------( 336      ( 22 Dec 2017 04:13 )             26.4     12-22    140  |  104  |  15  ----------------------------<  110<H>  4.2   |  29  |  1.10    Ca    8.9      22 Dec 2017 04:13          CAPILLARY BLOOD GLUCOSE                MEDICATIONS  (STANDING):  acetaminophen   Tablet. 650 milliGRAM(s) Oral every 8 hours  amLODIPine   Tablet 10 milliGRAM(s) Oral daily  ascorbic acid 500 milliGRAM(s) Oral two times a day  aspirin enteric coated 325 milliGRAM(s) Oral two times a day  celecoxib 200 milliGRAM(s) Oral two times a day after meals  docusate sodium 100 milliGRAM(s) Oral three times a day  ferrous    sulfate 325 milliGRAM(s) Oral three times a day with meals  folic acid 1 milliGRAM(s) Oral daily  levothyroxine 88 MICROGram(s) Oral daily  multivitamin 1 Tablet(s) Oral daily  pantoprazole    Tablet 40 milliGRAM(s) Oral daily    MEDICATIONS  (PRN):  HYDROmorphone   Tablet 2 milliGRAM(s) Oral every 4 hours PRN Moderate Pain (4 - 6)  HYDROmorphone   Tablet 4 milliGRAM(s) Oral every 4 hours PRN Severe Pain (7 - 10)  morphine  - Injectable 2 milliGRAM(s) IV Push every 4 hours PRN Severe Pain (7 - 10)  ondansetron Injectable 4 milliGRAM(s) IV Push every 6 hours PRN Nausea and/or Vomiting      REVIEW OF SYSTEMS:  CONSTITUTIONAL: No fever, weight loss, or fatigue  EYES: No eye pain, visual disturbances, or discharge  ENMT:  No difficulty hearing, tinnitus, vertigo; No sinus or throat pain  NECK: No pain or stiffness  RESPIRATORY: No cough, wheezing, chills or hemoptysis; No shortness of breath  CARDIOVASCULAR: No chest pain, palpitations, dizziness, or leg swelling  GASTROINTESTINAL: No abdominal or epigastric pain. No nausea, vomiting, or hematemesis; No diarrhea or constipation. No melena or hematochezia.    NEUROLOGICAL: No headaches, memory loss, loss of strength, numbness, or tremors       MUSCULOSKELETAL: No joint pain or swelling; No muscle, back, or extremity pain      RADIOLOGY & ADDITIONAL TESTS:    Imaging Personally Reviewed:  [x ] YES  [ ] NO    Consultant(s) Notes Reviewed:  [ ] YES  [ ] NO    PHYSICAL EXAM:  GENERAL: NAD, well-groomed, well-developed  HEAD:  Atraumatic, Normocephalic  EYES: EOMI, PERRLA, conjunctiva and sclera clear  NERVOUS SYSTEM:  Alert & Oriented X3, Good concentration; Motor Strength 5/5 B/L upper and lower extremities; DTRs 2+ intact and symmetric  CHEST/LUNG: Clear to percussion bilaterally; No rales, rhonchi, wheezing, or rubs  HEART: Regular rate and rhythm; No murmurs, rubs, or gallops  EXTREMITIES:  2+ Peripheral Pulses, No clubbing, cyanosis, or edema      Care Discussed with Consultants/Other Providers [ ] YES  [ ] NO

## 2017-12-27 DIAGNOSIS — D62 ACUTE POSTHEMORRHAGIC ANEMIA: ICD-10-CM

## 2017-12-27 DIAGNOSIS — M17.11 UNILATERAL PRIMARY OSTEOARTHRITIS, RIGHT KNEE: ICD-10-CM

## 2017-12-27 DIAGNOSIS — E03.9 HYPOTHYROIDISM, UNSPECIFIED: ICD-10-CM

## 2017-12-27 DIAGNOSIS — I10 ESSENTIAL (PRIMARY) HYPERTENSION: ICD-10-CM

## 2018-11-27 NOTE — H&P PST ADULT - NS MD HP INPLANTS MED DEV
VTE Prophylaxis: Reason for no pharmacologic prophylaxis Ambulating  patient is ambulate 3    Recommendations for Discharge:  · Psychiatric    Counseling / Coordination of Care Time: 45 minutes  Greater than 50% of total time spent on patient counseling and coordination of care  Collaboration of Care: Were Recommendations Directly Discussed with Primary Treatment Team? - Yes     History of Present Illness:    Kings Gonzalez is a 41 y o  female who is originally admitted to the psychiatric service service due to sickle  We are consulted for back pain and migraine  Review of Systems:    Review of Systems   Constitutional: Negative for appetite change, chills, fatigue and fever  HENT: Negative for hearing loss, sore throat and trouble swallowing  Eyes: Negative for photophobia, discharge and visual disturbance  Respiratory: Negative for chest tightness and shortness of breath  Cardiovascular: Negative for chest pain and palpitations  Gastrointestinal: Negative for abdominal pain, blood in stool and vomiting  Endocrine: Negative for polydipsia and polyuria  Genitourinary: Negative for difficulty urinating, dysuria, flank pain and hematuria  Musculoskeletal: Positive for arthralgias, back pain, gait problem and myalgias  Skin: Negative for rash  Allergic/Immunologic: Negative for environmental allergies and food allergies  Neurological: Negative for dizziness, seizures, syncope and headaches  Hematological: Does not bruise/bleed easily  Psychiatric/Behavioral: Negative for behavioral problems  All other systems reviewed and are negative        Past Medical and Surgical History:     Past Medical History:   Diagnosis Date    Asthma     [facility]     Migraine     Psychiatric disorder     depression, anxiety    Seizures ([facility] )        Past Surgical History:   Procedure Laterality Date    APPENDECTOMY      BLADDER SURGERY      CHOLECYSTECTOMY      GASTRECTOMY gastric sleeve    HYSTERECTOMY      TUBAL LIGATION         Meds/Allergies:    all medications and allergies reviewed    Allergies: Allergies   Allergen Reactions    Polyethylene Glycol Anaphylaxis    Depakote [Valproic Acid] Rash       Social History:     Marital Status:     Substance Use History:   History   Alcohol Use    Yes     Comment: ocassionally     History   Smoking Status    Current Every Day Smoker   Smokeless Tobacco    Never Used     Comment: on nicotine patch     History   Drug Use No       Family History:    non-contributory    Physical Exam:     Vitals:   Blood Pressure: 107/71 (11/27/18 1708)  Pulse: 95 (11/27/18 1708)  Temperature: (!) 97 2 °F (36 2 °C) (11/27/18 1708)  Temp Source: Temporal (11/27/18 1708)  Respirations: 18 (11/27/18 1708)  Height: 5' 2" (157 5 cm) (11/27/18 1708)  Weight - Scale: 71 7 kg (158 lb) (11/27/18 1708)  SpO2: 98 % (11/27/18 1708)    Physical Exam   Constitutional: She is oriented to person, place, and time  She appears well-developed and well-nourished  HENT:   Head: Normocephalic and atraumatic  Right Ear: External ear normal    Left Ear: External ear normal    Mouth/Throat: Oropharynx is clear and moist    Eyes: Pupils are equal, round, and reactive to light  Conjunctivae and EOM are normal    Neck: Normal range of motion  Neck supple  Cardiovascular: Normal rate, regular rhythm, normal heart sounds and intact distal pulses  Pulmonary/Chest: Effort normal and breath sounds normal    Abdominal: Soft  Bowel sounds are normal  She exhibits no mass  There is no tenderness  There is no rebound and no guarding  Genitourinary:   Genitourinary Comments: deferred   Musculoskeletal: Normal range of motion  Neurological: She is alert and oriented to person, place, and time  She has normal reflexes  Discomfort of lumbar spine   Skin: Skin is warm and dry  No rash noted  Psychiatric: She has a normal mood and affect     Nursing note and vitals reviewed  Additional Data:     Lab Results: I have personally reviewed pertinent reports  Results from last 7 days  Lab Units 11/27/18  0751   WBC Thousand/uL 11 30*   HEMOGLOBIN g/dL 14 6   HEMATOCRIT % 46 2*   PLATELETS Thousands/uL 524*   NEUTROS PCT % 62   LYMPHS PCT % 32   MONOS PCT % 4   EOS PCT % 1       Results from last 7 days  Lab Units 11/27/18  0751   SODIUM mmol/L 147   POTASSIUM mmol/L 4 8   CHLORIDE mmol/L 108   CO2 mmol/L 28   BUN mg/dL 9   CREATININE mg/dL 0 79   ANION GAP mmol/L 11   CALCIUM mg/dL 9 9   ALBUMIN g/dL 4 7   TOTAL BILIRUBIN mg/dL 0 20   ALK PHOS U/L 101   ALT U/L 27   AST U/L 25   GLUCOSE RANDOM mg/dL 108*             No results found for: HGBA1C            Imaging: I have personally reviewed pertinent reports  No orders to display       EKG, Pathology, and Other Studies Reviewed on Admission:   · EKG:  Not available    ** Please Note: This note has been constructed using a voice recognition system  **    Consult- Woodrow Gerber 1978, 41 y o  female MRN: [id number]    Unit/Bed#: [facility] 377-01 Encounter: [id number]    Primary Care Provider: Ede Rutherford MD   Date and time admitted to hospital: 11/27/2018  7:27 AM      Consults    Major depressive disorder   Assessment & Plan    Patient described she has a depressed anemia does not drink daily basis but because of that he drank do abnormal behavior brought in ER and admitted in psych unit management per psychiatrist discussed with the patient with alcohol abuse denied drinking no evidence of DTs management per psychiatrist     History of migraine   Assessment & Plan    Patient long history of migraine takes daily basis medication also pain does not really then takes amitriptyline injection  Will continue her p o   Medication     Low back pain   Assessment & Plan    Patient lower lumbar pain work related injury on disability seen and followed by pain management with a orthopedic surgeon planning for back surgery  Patient multiple pain management and also muscle relaxant  She is to quit smoking to go for back surgery she is still smoking  Also complain some numbness and tingling in the lower extremity  Does improve with medication     S/P laparoscopic sleeve gastrectomy   Assessment & Plan    Patient had laparoscopy sleeve gastrectomy at [facility] lost okay denied any GI symptoms taking vitamins 75 lb tolerating food None

## 2019-03-12 NOTE — DISCHARGE NOTE ADULT - NSFTFSTATUSABRD_GEN_ALL_CORE
Performing Laboratory: [id number] Billing Type: Third-Party Bill Bill For Surgical Tray: no PATIENT NEEDS ASSISTANCE TO LEAVE RESIDENCE:

## 2020-09-25 NOTE — PROGRESS NOTE ADULT - MINUTES
Act, 1135 waiver authority and the Coronavirus Preparedness and Response Supplemental Appropriations Act, this Virtual  Visit was conducted, with patient's consent, to reduce the patient's risk of exposure to COVID-19 and provide continuity of care for an established patient. Services were provided through a video synchronous discussion virtually to substitute for in-person clinic visit.
30

## 2021-10-23 NOTE — DISCHARGE NOTE ADULT - LAUNCH MEDICATION RECONCILIATION
[No Acute Distress] : no acute distress [Well Nourished] : well nourished [Well Developed] : well developed [Well-Appearing] : well-appearing [Normal Voice/Communication] : normal voice/communication [No Respiratory Distress] : no respiratory distress  [No Accessory Muscle Use] : no accessory muscle use [Clear to Auscultation] : lungs were clear to auscultation bilaterally [Normal Rate] : normal rate  [Regular Rhythm] : with a regular rhythm [Normal S1, S2] : normal S1 and S2 [Coordination Grossly Intact] : coordination grossly intact [Normal Mood] : the mood was normal <<-----Click here for Discharge Medication Review

## 2022-05-11 PROBLEM — M17.11 UNILATERAL PRIMARY OSTEOARTHRITIS, RIGHT KNEE: Chronic | Status: ACTIVE | Noted: 2017-12-07

## 2022-05-17 ENCOUNTER — APPOINTMENT (OUTPATIENT)
Dept: ORTHOPEDIC SURGERY | Facility: CLINIC | Age: 82
End: 2022-05-17
Payer: MEDICARE

## 2022-05-17 VITALS — BODY MASS INDEX: 22.33 KG/M2 | HEIGHT: 65 IN | WEIGHT: 134 LBS

## 2022-05-17 PROCEDURE — 20610 DRAIN/INJ JOINT/BURSA W/O US: CPT

## 2022-05-17 PROCEDURE — 99214 OFFICE O/P EST MOD 30 MIN: CPT | Mod: 25

## 2022-05-17 NOTE — PROCEDURE
[Large Joint Injection] : Large joint injection [Left] : of the left [Pain] : pain [Inflammation] : inflammation [X-ray evidence of Osteoarthritis on this or prior visit] : x-ray evidence of Osteoarthritis on this or prior visit [Betadine] : betadine [Ethyl Chloride sprayed topically] : ethyl chloride sprayed topically [Sterile technique used] : sterile technique used [___ cc    32 units 5mg] : Zilretta ~Vcc of 32 units 5 mg  [] : Patient tolerated procedure well [Call if redness, pain or fever occur] : call if redness, pain or fever occur [Apply ice for 15min out of every hour for the next 12-24 hours as tolerated] : apply ice for 15 minutes out of every hour for the next 12-24 hours as tolerated [Previous OTC use and PT nontherapeutic] : patient has tried OTC's including aspirin, Ibuprofen, Aleve, etc or prescription NSAIDS, and/or exercises at home and/or physical therapy without satisfactory response [Patient had decreased mobility in the joint] : patient had decreased mobility in the joint [Risks, benefits, alternatives discussed / Verbal consent obtained] : the risks benefits, and alternatives have been discussed, and verbal consent was obtained

## 2022-05-17 NOTE — ASSESSMENT
[FreeTextEntry1] : Previous doc:\par Right TKA 2017 10-70 ROM, had severe pain prior to surgery which started after an injection. Will get blood work to r/o infection. Left knee adv OA - will workup right knee first.\par 2/25/20: Blood work neg. Left knee OA - pain is worsening so will try inj today. Long discussion today regarding right rev TKA for arthrofibrosis vs left TKA - she is unsure how to proceed at this time so will see how she does after inj.\par 9/21/20: Left knee OA. Patient received cortisone injection in February with relief for 1 month. Patient is not ready for surgical treatment. Patient received gel injection in her right knee prior to tka with negative reaction . Patient will repeat cortisone injection left knee today. Right knee still with significant stiffness. Patient doesn't want to do a surgical revision at this time. Patient also complaining of lower back pain with radicular symptoms. Patient will begin pt for her left knee and Lspine.\par 11/3/20: Doing well at this time, cont PT and HEP, discussed repeat inj left knee in the future prn. Not interested in right rev TKA at this time.\par 6/1/21: Advanced OA in left knee but due to results in right knee patient would like to hold off on surgery. Patient had a history of a bad reaction of HA injection in left knee. Will repeat cortisone injection in left knee today.\par 11/2/21: Last cortisone inj not as successful as previous - zilretta inj today tolerated well.\par \par 5/17/22: Repeat zilretta left knee today tolerated well, not interested in TKA.

## 2022-05-17 NOTE — HISTORY OF PRESENT ILLNESS
[8] : 8 [6] : 6 [Dull/Aching] : dull/aching [Localized] : localized [Constant] : constant [Retired] : Work status: retired [de-identified] : 5/17/22: Zilretta helped x 6 months, pain returned 2 weeks ago, no new injury.\par \par Previous doc:\par Right TKA 12/21/17 by Dr. Snow always with pain and stiffness - before surgery had bone on bone OA with severe progressive pain and very poor ROM. Feels ROM is better than before surgery but still a lot of pain. Left knee with known OA but no treatments yet. Rght worse than left. No groin pain. Cannot take NSAIDs - 1 kidney.\par 2/25/20: Cont pain - left is worsening lately. Had blood work done.\par 9/21/20: Patient is complaining of left knee and buttock pain. Pain worsens with sitting. Patient received cortisone injection in February with significant relief for about 1 month. Patient is not ready for surgery. Patient would like to discuss injection.\par 11/3/20: Inj helped a lot and is now going to PT which is helping.\par 6/1/21: Pain still consistent in left knee\par 11/2/21: Left knee pain worsening - last inj in June helped but not as well as others. [] : Post Surgical Visit: no [FreeTextEntry1] : L Knee [FreeTextEntry3] : N/A Chronic [FreeTextEntry5] : Pt is a 83 y/o fem in for eval of the L Knee pt states NKI pt states pain is chronic pt states prior TX of Zilretta and ASP upon last visit on 11/2/21 [de-identified] : None [de-identified] :

## 2022-05-17 NOTE — DISCUSSION/SUMMARY
[de-identified] : The natural progression of Osteoarthritis was explained to the patient.  We discussed the possible treatment options from conservative to operative.  These included NSAIDS, Glucosamine and Chondrotin sulfate, Physical Therapy and injections.  We also discussed that at some point they may progress to needing a TKA.\par

## 2022-05-17 NOTE — IMAGING
[de-identified] : Left knee: , no effusion.  Stable.  Medial tenderness>  Antalgic gait, no assistive devices.

## 2022-10-11 ENCOUNTER — APPOINTMENT (OUTPATIENT)
Dept: ORTHOPEDIC SURGERY | Facility: CLINIC | Age: 82
End: 2022-10-11

## 2022-10-11 VITALS — BODY MASS INDEX: 22.33 KG/M2 | HEIGHT: 65 IN | WEIGHT: 134 LBS

## 2022-10-11 PROCEDURE — 20610 DRAIN/INJ JOINT/BURSA W/O US: CPT

## 2022-10-11 PROCEDURE — 99214 OFFICE O/P EST MOD 30 MIN: CPT | Mod: 25

## 2022-10-11 NOTE — HISTORY OF PRESENT ILLNESS
[9] : 9 [8] : 8 [Dull/Aching] : dull/aching [Sharp] : sharp [Constant] : constant [Household chores] : household chores [Leisure] : leisure [Nothing helps with pain getting better] : Nothing helps with pain getting better [Standing] : standing [Walking] : walking [de-identified] : 10/11/22: Zilretta helped until very recently.\par \par Previous doc:\par Right TKA 12/21/17 by Dr. Snow always with pain and stiffness - before surgery had bone on bone OA with severe progressive pain and very poor ROM. Feels ROM is better than before surgery but still a lot of pain. Left knee with known OA but no treatments yet. Rght worse than left. No groin pain. Cannot take NSAIDs - 1 kidney.\par 2/25/20: Cont pain - left is worsening lately. Had blood work done.\par 9/21/20: Patient is complaining of left knee and buttock pain. Pain worsens with sitting. Patient received cortisone injection in February with significant relief for about 1 month. Patient is not ready for surgery. Patient would like to discuss injection.\par 11/3/20: Inj helped a lot and is now going to PT which is helping.\par 6/1/21: Pain still consistent in left knee\par 11/2/21: Left knee pain worsening - last inj in June helped but not as well as others.\par 5/17/22: Zilretta helped x 6 months, pain returned 2 weeks ago, no new injury. [] : no [FreeTextEntry1] : lt knee [FreeTextEntry5] : Pt states pain has returned the same as before. Pt states pain is anterior and posterior. Pt tried nsaids.

## 2022-10-11 NOTE — DISCUSSION/SUMMARY
[de-identified] : The natural progression of Osteoarthritis was explained to the patient.  We discussed the possible treatment options from conservative to operative.  These included NSAIDS, Glucosamine and Chondrotin sulfate, Physical Therapy and injections.  We also discussed that at some point they may progress to needing a TKA.\par

## 2022-10-11 NOTE — IMAGING
[de-identified] : Left knee: , no effusion.  Stable.  Medial tenderness>  Antalgic gait, no assistive devices.

## 2022-10-11 NOTE — ASSESSMENT
[FreeTextEntry1] : Previous doc:\par Right TKA 2017 10-70 ROM, had severe pain prior to surgery which started after an injection. Will get blood work to r/o infection. Left knee adv OA - will workup right knee first.\par 2/25/20: Blood work neg. Left knee OA - pain is worsening so will try inj today. Long discussion today regarding right rev TKA for arthrofibrosis vs left TKA - she is unsure how to proceed at this time so will see how she does after inj.\par 9/21/20: Left knee OA. Patient received cortisone injection in February with relief for 1 month. Patient is not ready for surgical treatment. Patient received gel injection in her right knee prior to tka with negative reaction . Patient will repeat cortisone injection left knee today. Right knee still with significant stiffness. Patient doesn't want to do a surgical revision at this time. Patient also complaining of lower back pain with radicular symptoms. Patient will begin pt for her left knee and Lspine.\par 11/3/20: Doing well at this time, cont PT and HEP, discussed repeat inj left knee in the future prn. Not interested in right rev TKA at this time.\par 6/1/21: Advanced OA in left knee but due to results in right knee patient would like to hold off on surgery. Patient had a history of a bad reaction of HA injection in left knee. Will repeat cortisone injection in left knee today.\par 11/2/21: Last cortisone inj not as successful as previous - zilretta inj today tolerated well.\par 5/17/22: Repeat zilretta left knee today tolerated well, not interested in TKA.\par \par 10/11/22: Repeat zilretta left knee tolerated well.

## 2023-04-24 NOTE — ASU PREOP CHECKLIST - IDENTIFICATION BAND VERIFIED
Pain Refusal Text: I offered to prescribe pain medication but the patient refused to take this medication. done

## 2023-05-23 ENCOUNTER — APPOINTMENT (OUTPATIENT)
Dept: ORTHOPEDIC SURGERY | Facility: CLINIC | Age: 83
End: 2023-05-23
Payer: MEDICARE

## 2023-05-23 ENCOUNTER — RESULT CHARGE (OUTPATIENT)
Age: 83
End: 2023-05-23

## 2023-05-23 VITALS — WEIGHT: 134 LBS | HEIGHT: 65 IN | BODY MASS INDEX: 22.33 KG/M2

## 2023-05-23 PROCEDURE — 72100 X-RAY EXAM L-S SPINE 2/3 VWS: CPT

## 2023-05-23 PROCEDURE — 20610 DRAIN/INJ JOINT/BURSA W/O US: CPT | Mod: LT

## 2023-05-23 PROCEDURE — 72170 X-RAY EXAM OF PELVIS: CPT

## 2023-05-23 PROCEDURE — 99214 OFFICE O/P EST MOD 30 MIN: CPT | Mod: 25

## 2023-05-23 NOTE — DISCUSSION/SUMMARY
[de-identified] : The patient was advised of the diagnosis.  The natural history of the pathology was explained in full to the patient in layman's terms. All questions were answered.  The risks and benefits of surgical and non-surgical treatment alternatives were explained in full to the patient.\par

## 2023-05-23 NOTE — IMAGING
[Disc space narrowing] : Disc space narrowing [AP] : anteroposterior [There are no fractures, subluxations or dislocations. No significant abnormalities are seen] : There are no fractures, subluxations or dislocations. No significant abnormalities are seen [de-identified] : Left knee: , no effusion.  Stable.  Medial tenderness>  Antalgic gait, no assistive devices.\par \par Back: Left paraspinal tenderness.  Decreased ROM.  NVI.  Neg SLR.  No pain with hip ROM.

## 2023-05-23 NOTE — ASSESSMENT
[FreeTextEntry1] : Previous doc:\par Right TKA 2017 10-70 ROM, had severe pain prior to surgery which started after an injection. Will get blood work to r/o infection. Left knee adv OA - will workup right knee first.\par 2/25/20: Blood work neg. Left knee OA - pain is worsening so will try inj today. Long discussion today regarding right rev TKA for arthrofibrosis vs left TKA - she is unsure how to proceed at this time so will see how she does after inj.\par 9/21/20: Left knee OA. Patient received cortisone injection in February with relief for 1 month. Patient is not ready for surgical treatment. Patient received gel injection in her right knee prior to tka with negative reaction . Patient will repeat cortisone injection left knee today. Right knee still with significant stiffness. Patient doesn't want to do a surgical revision at this time. Patient also complaining of lower back pain with radicular symptoms. Patient will begin pt for her left knee and Lspine.\par 11/3/20: Doing well at this time, cont PT and HEP, discussed repeat inj left knee in the future prn. Not interested in right rev TKA at this time.\par 6/1/21: Advanced OA in left knee but due to results in right knee patient would like to hold off on surgery. Patient had a history of a bad reaction of HA injection in left knee. Will repeat cortisone injection in left knee today.\par 11/2/21: Last cortisone inj not as successful as previous - zilretta inj today tolerated well.\par 5/17/22: Repeat zilretta left knee today tolerated well, not interested in TKA.\par 10/11/22: Repeat zilretta left knee tolerated well.\par \par 5/23/23: Left knee zilretta tolerated well.  Lspine DDD causing new onset back pain - no neuro symptoms - PT and if no improvement will get MRI.

## 2023-05-23 NOTE — HISTORY OF PRESENT ILLNESS
[7] : 7 [5] : 5 [Dull/Aching] : dull/aching [de-identified] : 5/23/23: Left knee pain worsening, zilretta helped for a few months.  Also left sided low back pain - no radiating pain down the leg.  No groin pain.  NSAIDs not helping.\par \par Previous doc:\par Right TKA 12/21/17 by Dr. Snow always with pain and stiffness - before surgery had bone on bone OA with severe progressive pain and very poor ROM. Feels ROM is better than before surgery but still a lot of pain. Left knee with known OA but no treatments yet. Rght worse than left. No groin pain. Cannot take NSAIDs - 1 kidney.\par 2/25/20: Cont pain - left is worsening lately. Had blood work done.\par 9/21/20: Patient is complaining of left knee and buttock pain. Pain worsens with sitting. Patient received cortisone injection in February with significant relief for about 1 month. Patient is not ready for surgery. Patient would like to discuss injection.\par 11/3/20: Inj helped a lot and is now going to PT which is helping.\par 6/1/21: Pain still consistent in left knee\par 11/2/21: Left knee pain worsening - last inj in June helped but not as well as others.\par 5/17/22: Zilretta helped x 6 months, pain returned 2 weeks ago, no new injury.\par 10/11/22: Zilretta helped until very recently.

## 2023-08-22 ENCOUNTER — APPOINTMENT (OUTPATIENT)
Dept: ORTHOPEDIC SURGERY | Facility: CLINIC | Age: 83
End: 2023-08-22
Payer: MEDICARE

## 2023-08-22 VITALS — BODY MASS INDEX: 22.33 KG/M2 | HEIGHT: 65 IN | WEIGHT: 134 LBS

## 2023-08-22 PROCEDURE — 20610 DRAIN/INJ JOINT/BURSA W/O US: CPT | Mod: LT

## 2023-08-22 PROCEDURE — 99214 OFFICE O/P EST MOD 30 MIN: CPT | Mod: 25

## 2023-08-22 NOTE — DISCUSSION/SUMMARY
[de-identified] : The patient was advised of the diagnosis.  The natural history of the pathology was explained in full to the patient in layman's terms. All questions were answered.  The risks and benefits of surgical and non-surgical treatment alternatives were explained in full to the patient.\par

## 2023-08-22 NOTE — HISTORY OF PRESENT ILLNESS
[10] : 10 [5] : 5 [Dull/Aching] : dull/aching [Intermittent] : intermittent [de-identified] : 8/22/23: Inj helped until last week.  Previous doc: Right TKA 12/21/17 by Dr. Snow always with pain and stiffness - before surgery had bone on bone OA with severe progressive pain and very poor ROM. Feels ROM is better than before surgery but still a lot of pain. Left knee with known OA but no treatments yet. Rght worse than left. No groin pain. Cannot take NSAIDs - 1 kidney. 2/25/20: Cont pain - left is worsening lately. Had blood work done. 9/21/20: Patient is complaining of left knee and buttock pain. Pain worsens with sitting. Patient received cortisone injection in February with significant relief for about 1 month. Patient is not ready for surgery. Patient would like to discuss injection. 11/3/20: Inj helped a lot and is now going to PT which is helping. 6/1/21: Pain still consistent in left knee 11/2/21: Left knee pain worsening - last inj in June helped but not as well as others. 5/17/22: Zilretta helped x 6 months, pain returned 2 weeks ago, no new injury. 10/11/22: Zilretta helped until very recently. 5/23/23: Left knee pain worsening, zilretta helped for a few months.  Also left sided low back pain - no radiating pain down the leg.  No groin pain.  NSAIDs not helping. [] : no [FreeTextEntry1] : LT knee  [FreeTextEntry5] : Patient is here for FU on the LT knee. States pain increased.

## 2023-08-22 NOTE — ASSESSMENT
[FreeTextEntry1] : Previous doc: Right TKA 2017 10-70 ROM, had severe pain prior to surgery which started after an injection. Will get blood work to r/o infection. Left knee adv OA - will workup right knee first. 2/25/20: Blood work neg. Left knee OA - pain is worsening so will try inj today. Long discussion today regarding right rev TKA for arthrofibrosis vs left TKA - she is unsure how to proceed at this time so will see how she does after inj. 9/21/20: Left knee OA. Patient received cortisone injection in February with relief for 1 month. Patient is not ready for surgical treatment. Patient received gel injection in her right knee prior to tka with negative reaction . Patient will repeat cortisone injection left knee today. Right knee still with significant stiffness. Patient doesn't want to do a surgical revision at this time. Patient also complaining of lower back pain with radicular symptoms. Patient will begin pt for her left knee and Lspine. 11/3/20: Doing well at this time, cont PT and HEP, discussed repeat inj left knee in the future prn. Not interested in right rev TKA at this time. 6/1/21: Advanced OA in left knee but due to results in right knee patient would like to hold off on surgery. Patient had a history of a bad reaction of HA injection in left knee. Will repeat cortisone injection in left knee today. 11/2/21: Last cortisone inj not as successful as previous - zilretta inj today tolerated well. 5/17/22: Repeat zilretta left knee today tolerated well, not interested in TKA. 10/11/22: Repeat zilretta left knee tolerated well. 5/23/23: Left knee zilretta tolerated well.  Lspine DDD causing new onset back pain - no neuro symptoms - PT and if no improvement will get MRI.  8/22/23: Repeat zilretta left knee tolerated well.

## 2023-08-22 NOTE — IMAGING
[Disc space narrowing] : Disc space narrowing [AP] : anteroposterior [There are no fractures, subluxations or dislocations. No significant abnormalities are seen] : There are no fractures, subluxations or dislocations. No significant abnormalities are seen [de-identified] : Left knee: , no effusion.  Stable.  Medial tenderness>  Antalgic gait, no assistive devices.\par  \par  Back: Left paraspinal tenderness.  Decreased ROM.  NVI.  Neg SLR.  No pain with hip ROM.

## 2023-08-22 NOTE — END OF VISIT
[FreeTextEntry3] : I Dr. Arriaga, personally performed the evaluation and management services for this established patient who present today with a new problem/exacerbation of an existing condition. The E/M includes conducting the examination, assessing all new/exacerbated conditions, and establishing a new plan of care. Today, my KISHAN, Gaurav Tena, was here to observe in my evaluation and management services of this new problem/exacerbated condition to be followed going forward.

## 2023-12-01 ENCOUNTER — APPOINTMENT (OUTPATIENT)
Dept: ORTHOPEDIC SURGERY | Facility: CLINIC | Age: 83
End: 2023-12-01
Payer: MEDICARE

## 2023-12-01 VITALS — WEIGHT: 134 LBS | BODY MASS INDEX: 22.33 KG/M2 | HEIGHT: 65 IN

## 2023-12-01 PROCEDURE — 99214 OFFICE O/P EST MOD 30 MIN: CPT | Mod: 25

## 2023-12-01 PROCEDURE — 20610 DRAIN/INJ JOINT/BURSA W/O US: CPT | Mod: LT

## 2023-12-04 ENCOUNTER — APPOINTMENT (OUTPATIENT)
Dept: MRI IMAGING | Facility: CLINIC | Age: 83
End: 2023-12-04
Payer: MEDICARE

## 2023-12-04 PROCEDURE — 72148 MRI LUMBAR SPINE W/O DYE: CPT | Mod: MH

## 2023-12-13 ENCOUNTER — APPOINTMENT (OUTPATIENT)
Dept: PAIN MANAGEMENT | Facility: CLINIC | Age: 83
End: 2023-12-13
Payer: MEDICARE

## 2023-12-13 VITALS — BODY MASS INDEX: 22.33 KG/M2 | WEIGHT: 134 LBS | HEIGHT: 65 IN

## 2023-12-13 DIAGNOSIS — Z85.528 PERSONAL HISTORY OF OTHER MALIGNANT NEOPLASM OF KIDNEY: ICD-10-CM

## 2023-12-13 PROCEDURE — 99204 OFFICE O/P NEW MOD 45 MIN: CPT

## 2023-12-13 NOTE — DATA REVIEWED
[FreeTextEntry1] : 12/4/2023: L4-L5: left-sided disc herniation and left-sided facet OA w/o stenosis L5-S1: central disc herniation and moderate bilateral facet OA

## 2023-12-13 NOTE — DISCUSSION/SUMMARY
[de-identified] : LOLY ALICEA is a 83 year-old woman presenting for a NPV for a history of chronic low back pain.   Prior treatment: Physical therapy x6 weeks Acetaminophen  Plan: 1) MRI lumbar spine images reviewed with the patient. Patient appears to have a left-sided foraminal disc herniation at L5-S1.  2) Schedule LEFT L5-S1 TFESI w/o MAC. The procedure was explained to the patient in detail. Reviewed risks, benefits, and alternatives with the patient. The patient expressed understanding and would like to proceed.  3) Continue acetaminophen prn 4) Discussed trial of medications, patient declined 5) Continue home exercise regimen 6) RTC post procedure

## 2023-12-13 NOTE — HISTORY OF PRESENT ILLNESS
[FreeTextEntry1] : 12/13/2023: LOLY ALICEA is a 83 year-old woman presenting for a NPV for a history of chronic low back pain. The patient notes having pain in the low back for 4 months. She tried PT for 6 weeks without improvement. She denies any history of trauma or focal injury. The patient notes having an aching pain in the left low lumbosacral region with radiation to the left gluteal region and posterior thigh. No focal numbness or weakness in the lower extremities. No bowel or bladder incontinence or saddle anesthesia. Pain is worse with standing and walking as well as with transitioning from sitting to standing.  The patient states that average pain over the past week was 10 /10 in severity. Mood: Patient notes mild depression. No anxiety.  Sleep: No difficulty with sleep, though the patient notes pain when changing positions.

## 2023-12-13 NOTE — PHYSICAL EXAM
[de-identified] : Gen: NAD Head: NC/AT Eyes: no glasses, no scleral icterus ENT: mucous membranes moist CV: RRR, S1 S2, no mrg Lungs: CTAB, nonlabored breathing Abd: soft, NT/ND Ext: full ROM in all extremities, no peripheral edema Back: +TTP in the left low lumbar facet region and left SI joint; +SLR on the left Neuro: CN intact LEs +5 L +5 R hip flexion +5 L +5 R leg extension +5 L +5 R leg flexion +5 L +5 R foot dorsiflexion +5 L +5 R foot plantarflexion +5 L +5 R EHL extension Psych: normal affect Skin: no visible lesions

## 2023-12-29 ENCOUNTER — APPOINTMENT (OUTPATIENT)
Dept: PAIN MANAGEMENT | Facility: CLINIC | Age: 83
End: 2023-12-29
Payer: MEDICARE

## 2023-12-29 PROCEDURE — 64483 NJX AA&/STRD TFRM EPI L/S 1: CPT | Mod: LT

## 2023-12-29 NOTE — PROCEDURE
[FreeTextEntry1] : LEFT L5-S1 transforaminal epidural steroid injection [FreeTextEntry2] : LEFT lumbar radiculopathy [FreeTextEntry3] : Procedure Date: 12/29/2023   Preoperative Diagnosis: chronic low back pain with LEFT sided sciatica   Procedure: LEFT L5-S1  transforaminal epidural steroid injection under fluoroscopic guidance   Anesthesia: local   Complications: none   EBL: none   Procedure in detail: Patient was seen and examined. Risks, benefits, and alternatives for the procedure were discussed with the patient in detail. The patient expressed understanding, gave written and verbal consent, and placed themselves in a prone position on the procedure table. Skin overlying the lumbosacral spine was prepped with chloraprep and draped in the usual sterile fashion. Fluoroscopic images were obtained to identify the L5 vertebral body. Target was the 6 o'clock position under the LEFT pedicle at the L5 vertebral body. Skin overlying the target was marked and infiltrated with 1% lidocaine. A 25 gauge, 5 inch spinal needle was inserted and advanced under intermittent fluoroscopic guidance. When felt to be engaged in the epidural space, lateral view was used to confirm depth. After negative aspiration for heme/csf, contrast was injected under live fluoroscopy which showed contrast spread consistent with epidural flow. No evidence of intravascular or intrathecal uptake. At this point, a total of 2ml of injectate, which consisted of 1ml of 10mg/ml dexamethasone and 1ml of normal saline was injected through the needle. The needle was restyletted and withdrawn, a band aid was placed over the injection site. Patient tolerated the procedure well. The patient recovered uneventfully and was discharged home in stable condition.

## 2024-01-19 ENCOUNTER — APPOINTMENT (OUTPATIENT)
Dept: PAIN MANAGEMENT | Facility: CLINIC | Age: 84
End: 2024-01-19
Payer: MEDICARE

## 2024-01-19 VITALS — WEIGHT: 210 LBS | BODY MASS INDEX: 34.99 KG/M2 | HEIGHT: 65 IN

## 2024-01-19 PROCEDURE — 99214 OFFICE O/P EST MOD 30 MIN: CPT

## 2024-01-19 RX ORDER — MELOXICAM 15 MG/1
15 TABLET ORAL
Qty: 30 | Refills: 1 | Status: ACTIVE | COMMUNITY
Start: 2024-01-19 | End: 1900-01-01

## 2024-01-19 NOTE — PHYSICAL EXAM
[de-identified] : Gen: NAD Head: NC/AT Eyes: no glasses, no scleral icterus ENT: mucous membranes moist CV: RRR, S1 S2, no mrg Lungs: CTAB, nonlabored breathing Abd: soft, NT/ND Ext: full ROM in all extremities, no peripheral edema Back: +TTP in the left low lumbar facet region and left SI joint; +DAKOTAH/gaenslen's/yeoman's on the LEFT Neuro: CN intact LEs +5 L +5 R hip flexion +5 L +5 R leg extension +5 L +5 R leg flexion +5 L +5 R foot dorsiflexion +5 L +5 R foot plantarflexion +5 L +5 R EHL extension Psych: normal affect Skin: no visible lesions

## 2024-01-19 NOTE — DATA REVIEWED
[MRI] : MRI [Lumbar Spine] : lumbar spine [Report was reviewed and noted in the chart] : The report was reviewed and noted in the chart [I independently reviewed and interpreted images and report] : I independently reviewed and interpreted images and report [FreeTextEntry1] : 12/4/2023: L4-L5: left-sided disc herniation and left-sided facet OA w/o stenosis L5-S1: central disc herniation and moderate bilateral facet OA

## 2024-01-19 NOTE — HISTORY OF PRESENT ILLNESS
[Lower back] : lower back [9] : 9 [8] : 8 [Dull/Aching] : dull/aching [Constant] : constant [Household chores] : household chores [Leisure] : leisure [Sleep] : sleep [Injection therapy] : injection therapy [Nothing helps with pain getting better] : Nothing helps with pain getting better [Sitting] : sitting [Walking] : walking [FreeTextEntry1] : 1/19/2024: LOLY ALICEA is a 83 year-old woman presenting for a RPV for a history of chronic low back pain. The patient underwent a LEFT L5-S1 TFESI on 12/29/2023. The patient does not note significant improvement of her pain since the procedure. She continues to have significant pain in the left low lumbosacral region. No radiation of the pain to the left lower extremity anymore. Pain is worse with sitting for long periods.  The patient states that average pain over the past week was 5/10 in severity. Mood: Patient notes ongoing depressed mood. No anxiety.  Sleep: No difficulty with sleep initiation or maintenance at this time.   12/13/2023: LOLY ALICEA is a 83 year-old woman presenting for a NPV for a history of chronic low back pain. The patient notes having pain in the low back for 4 months. She tried PT for 6 weeks without improvement. She denies any history of trauma or focal injury. The patient notes having an aching pain in the left low lumbosacral region with radiation to the left gluteal region and posterior thigh. No focal numbness or weakness in the lower extremities. No bowel or bladder incontinence or saddle anesthesia. Pain is worse with standing and walking as well as with transitioning from sitting to standing.  The patient states that average pain over the past week was 10 /10 in severity. Mood: Patient notes mild depression. No anxiety.  Sleep: No difficulty with sleep, though the patient notes pain when changing positions.  [] : no [FreeTextEntry6] : MSUCLE SPASMS, TPI [FreeTextEntry7] : B/L LEGS  [de-identified] : FOR A LONG PERIOD OF TIME [de-identified] : L MRI

## 2024-01-19 NOTE — DISCUSSION/SUMMARY
[de-identified] : LOLY ALICEA is a 83 year-old woman presenting for a RPV for a history of chronic low back pain.   Prior treatment: 12/29/2023: LEFT L5-S1 TFESI w/o significant relief Physical therapy x6 weeks Acetaminophen  Plan: 1) MRI lumbar spine images reviewed with the patient. Patient appears to have a left-sided foraminal disc herniation at L5-S1.  2) Discussed a LEFT SI joint injection w/o MAC. The patient would like to defer for now. 3) Continue acetaminophen prn 4) Trial meloxicam 15mg daily prn; Discussed the risks of NSAIDs, including worsening HTN, cardiovascular risks, GI risk, renal injury. The patient was told not to take other NSAIDs with this and to consult with their PCP if there is a significant medical history to warrant this prior to initiating treatment.  5) Continue home exercise regimen 6) RTC 4 weeks

## 2024-02-16 ENCOUNTER — APPOINTMENT (OUTPATIENT)
Dept: PAIN MANAGEMENT | Facility: CLINIC | Age: 84
End: 2024-02-16

## 2024-03-06 ENCOUNTER — APPOINTMENT (OUTPATIENT)
Dept: PAIN MANAGEMENT | Facility: CLINIC | Age: 84
End: 2024-03-06
Payer: MEDICARE

## 2024-03-07 NOTE — DATA REVIEWED
[MRI] : MRI [Lumbar Spine] : lumbar spine [I independently reviewed and interpreted images and report] : I independently reviewed and interpreted images and report [Report was reviewed and noted in the chart] : The report was reviewed and noted in the chart [FreeTextEntry1] : 12/4/2023: L4-L5: left-sided disc herniation and left-sided facet OA w/o stenosis L5-S1: central disc herniation and moderate bilateral facet OA

## 2024-03-07 NOTE — HISTORY OF PRESENT ILLNESS
[Lower back] : lower back [9] : 9 [8] : 8 [Dull/Aching] : dull/aching [Constant] : constant [Leisure] : leisure [Sleep] : sleep [Household chores] : household chores [Injection therapy] : injection therapy [Nothing helps with pain getting better] : Nothing helps with pain getting better [Sitting] : sitting [Walking] : walking [FreeTextEntry1] : 3/6/2024: LOLY ALICEA is a 83 year-old woman presenting for a RPV for a history of chronic low back pain. The patient was started on meloxicam at her last visit on 1/19/2024.    The patient states that average pain over the past week was /10 in severity. Mood: Sleep:  1/19/2024: LOLY ALICEA is a 83 year-old woman presenting for a RPV for a history of chronic low back pain. The patient underwent a LEFT L5-S1 TFESI on 12/29/2023. The patient does not note significant improvement of her pain since the procedure. She continues to have significant pain in the left low lumbosacral region. No radiation of the pain to the left lower extremity anymore. Pain is worse with sitting for long periods.  The patient states that average pain over the past week was 5/10 in severity. Mood: Patient notes ongoing depressed mood. No anxiety.  Sleep: No difficulty with sleep initiation or maintenance at this time.   12/13/2023: LOLY ALICEA is a 83 year-old woman presenting for a NPV for a history of chronic low back pain. The patient notes having pain in the low back for 4 months. She tried PT for 6 weeks without improvement. She denies any history of trauma or focal injury. The patient notes having an aching pain in the left low lumbosacral region with radiation to the left gluteal region and posterior thigh. No focal numbness or weakness in the lower extremities. No bowel or bladder incontinence or saddle anesthesia. Pain is worse with standing and walking as well as with transitioning from sitting to standing.  The patient states that average pain over the past week was 10 /10 in severity. Mood: Patient notes mild depression. No anxiety.  Sleep: No difficulty with sleep, though the patient notes pain when changing positions.  [] : no [FreeTextEntry6] : MSUCLE SPASMS, TPI [FreeTextEntry7] : B/L LEGS  [de-identified] : FOR A LONG PERIOD OF TIME [de-identified] : L MRI

## 2024-03-07 NOTE — PHYSICAL EXAM
[de-identified] : Gen: NAD Head: NC/AT Eyes: no glasses, no scleral icterus ENT: mucous membranes moist CV: RRR, S1 S2, no mrg Lungs: CTAB, nonlabored breathing Abd: soft, NT/ND Ext: full ROM in all extremities, no peripheral edema Back: +TTP in the left low lumbar facet region and left SI joint; +DAKOTAH/gaenslen's/yeoman's on the LEFT Neuro: CN intact LEs +5 L +5 R hip flexion +5 L +5 R leg extension +5 L +5 R leg flexion +5 L +5 R foot dorsiflexion +5 L +5 R foot plantarflexion +5 L +5 R EHL extension Psych: normal affect Skin: no visible lesions

## 2024-03-07 NOTE — DISCUSSION/SUMMARY
[de-identified] : LOLY ALICEA is a 83 year-old woman presenting for a RPV for a history of chronic low back pain.   Prior treatment: 12/29/2023: LEFT L5-S1 TFESI w/o significant relief Physical therapy x6 weeks Acetaminophen  Plan: 1) MRI lumbar spine images reviewed with the patient. Patient appears to have a left-sided foraminal disc herniation at L5-S1.  2) Discussed a LEFT SI joint injection w/o MAC. The patient would like to defer for now. 3) Continue acetaminophen prn 4) Trial meloxicam 15mg daily prn; Discussed the risks of NSAIDs, including worsening HTN, cardiovascular risks, GI risk, renal injury. The patient was told not to take other NSAIDs with this and to consult with their PCP if there is a significant medical history to warrant this prior to initiating treatment.  5) Continue home exercise regimen 6) RTC 4 weeks

## 2024-03-12 PROBLEM — M54.16 CHRONIC LEFT-SIDED LUMBAR RADICULOPATHY: Status: ACTIVE | Noted: 2023-12-13

## 2024-03-13 ENCOUNTER — APPOINTMENT (OUTPATIENT)
Dept: PAIN MANAGEMENT | Facility: CLINIC | Age: 84
End: 2024-03-13
Payer: MEDICARE

## 2024-03-13 VITALS — BODY MASS INDEX: 22.82 KG/M2 | WEIGHT: 137 LBS | HEIGHT: 65 IN

## 2024-03-13 DIAGNOSIS — M54.16 RADICULOPATHY, LUMBAR REGION: ICD-10-CM

## 2024-03-13 DIAGNOSIS — F32.A DEPRESSION, UNSPECIFIED: ICD-10-CM

## 2024-03-13 PROCEDURE — 99214 OFFICE O/P EST MOD 30 MIN: CPT

## 2024-03-13 RX ORDER — SERTRALINE HYDROCHLORIDE 50 MG/1
50 TABLET, FILM COATED ORAL DAILY
Qty: 30 | Refills: 1 | Status: ACTIVE | COMMUNITY
Start: 2024-03-13 | End: 1900-01-01

## 2024-03-13 NOTE — HISTORY OF PRESENT ILLNESS
[Lower back] : lower back [9] : 9 [8] : 8 [Dull/Aching] : dull/aching [Household chores] : household chores [Constant] : constant [Leisure] : leisure [Injection therapy] : injection therapy [Sleep] : sleep [Sitting] : sitting [Nothing helps with pain getting better] : Nothing helps with pain getting better [Walking] : walking [Buttock] : buttock [10] : 10 [7] : 7 [Rest] : rest [FreeTextEntry1] : 3/13/2024: LOLY ALICEA is a 83 year-old woman presenting for a RPV for a history of chronic low back pain. The patient was started on meloxicam at the last visit. She does not note any significant improvement in her pain with this medication. She continues to note an aching, throbbing pain in the left low lumbosacral region. No radiation of the pain to the lower extremities. No focal numbness or weakness in the lower extremities. No bowel or bladder incontinence or saddle anesthesia. Pain is worse with sitting for long periods.  The patient states that average pain over the past week was 7/10 in severity. Mood: Patient has ongoing depression. No anxiety. She notes that her depression is not currently being treated.  Sleep: Patient notes occasional difficulty with sleep maintenance 2/2 pain.   1/19/2024: LOLY ALICEA is a 83 year-old woman presenting for a RPV for a history of chronic low back pain. The patient underwent a LEFT L5-S1 TFESI on 12/29/2023. The patient does not note significant improvement of her pain since the procedure. She continues to have significant pain in the left low lumbosacral region. No radiation of the pain to the left lower extremity anymore. Pain is worse with sitting for long periods.  The patient states that average pain over the past week was 5/10 in severity. Mood: Patient notes ongoing depressed mood. No anxiety.  Sleep: No difficulty with sleep initiation or maintenance at this time.   12/13/2023: LOLY ALICEA is a 83 year-old woman presenting for a NPV for a history of chronic low back pain. The patient notes having pain in the low back for 4 months. She tried PT for 6 weeks without improvement. She denies any history of trauma or focal injury. The patient notes having an aching pain in the left low lumbosacral region with radiation to the left gluteal region and posterior thigh. No focal numbness or weakness in the lower extremities. No bowel or bladder incontinence or saddle anesthesia. Pain is worse with standing and walking as well as with transitioning from sitting to standing.  The patient states that average pain over the past week was 10 /10 in severity. Mood: Patient notes mild depression. No anxiety.  Sleep: No difficulty with sleep, though the patient notes pain when changing positions.  [] : no [FreeTextEntry6] : MSUCLE SPASMS, TPI [FreeTextEntry7] : B/L LEGS  [de-identified] : getting out of the chair; injection [de-identified] : L MRI

## 2024-03-13 NOTE — DISCUSSION/SUMMARY
[de-identified] : LOLY ALICEA is a 83 year-old woman presenting for a RPV for a history of chronic low back pain.   Prior treatment: 12/29/2023: LEFT L5-S1 TFESI w/o significant relief Physical therapy x6 weeks Acetaminophen  Plan: 1) MRI lumbar spine images reviewed with the patient. Patient appears to have a left-sided foraminal disc herniation at L5-S1.  2) Schedule a LEFT SI joint injection w/o MAC. The procedure was explained to the patient in detail. Reviewed risks, benefits, and alternatives with the patient. Some risks discussed included temporary increase in pain, bleeding, infection, and side effects from steroids. The patient expressed understanding and would like to proceed.  3) Continue acetaminophen prn 4) Trial sertraline 50mg daily; Discussed AEs--patient to follow up with PCP if she would like to continue this 5) Continue home exercise regimen 6) RTC post procedure

## 2024-03-13 NOTE — PHYSICAL EXAM
[de-identified] : Gen: NAD Head: NC/AT Eyes: no glasses, no scleral icterus ENT: mucous membranes moist CV: RRR, S1 S2, no mrg Lungs: CTAB, nonlabored breathing Abd: soft, NT/ND Ext: full ROM in all extremities, no peripheral edema Back: +TTP in the left low lumbar facet region and left SI joint; +DAKOTAH/gaenslen's/yeoman's on the LEFT Neuro: CN intact LEs +5 L +5 R hip flexion +5 L +5 R leg extension +5 L +5 R leg flexion +5 L +5 R foot dorsiflexion +5 L +5 R foot plantarflexion +5 L +5 R EHL extension Psych: normal affect Skin: no visible lesions

## 2024-03-26 ENCOUNTER — APPOINTMENT (OUTPATIENT)
Dept: ORTHOPEDIC SURGERY | Facility: CLINIC | Age: 84
End: 2024-03-26
Payer: MEDICARE

## 2024-03-26 DIAGNOSIS — M17.12 UNILATERAL PRIMARY OSTEOARTHRITIS, LEFT KNEE: ICD-10-CM

## 2024-03-26 PROCEDURE — 99214 OFFICE O/P EST MOD 30 MIN: CPT | Mod: 25

## 2024-03-26 PROCEDURE — 20610 DRAIN/INJ JOINT/BURSA W/O US: CPT | Mod: LT

## 2024-03-26 NOTE — PROCEDURE
[Large Joint Injection] : Large joint injection [Left] : of the left [Pain] : pain [X-ray evidence of Osteoarthritis on this or prior visit] : x-ray evidence of Osteoarthritis on this or prior visit [Inflammation] : inflammation [Betadine] : betadine [Ethyl Chloride sprayed topically] : ethyl chloride sprayed topically [___ cc    32 units 5mg] : Zilretta ~Vcc of 32 units 5 mg  [Sterile technique used] : sterile technique used [] : Patient tolerated procedure well [Call if redness, pain or fever occur] : call if redness, pain or fever occur [Apply ice for 15min out of every hour for the next 12-24 hours as tolerated] : apply ice for 15 minutes out of every hour for the next 12-24 hours as tolerated [Previous OTC use and PT nontherapeutic] : patient has tried OTC's including aspirin, Ibuprofen, Aleve, etc or prescription NSAIDS, and/or exercises at home and/or physical therapy without satisfactory response [Risks, benefits, alternatives discussed / Verbal consent obtained] : the risks benefits, and alternatives have been discussed, and verbal consent was obtained [Patient had decreased mobility in the joint] : patient had decreased mobility in the joint [All ultrasound images have been permanently captured and stored accordingly in our picture archiving and communication system] : All ultrasound images have been permanently captured and stored accordingly in our picture archiving and communication system [Visualization of the needle and placement of injection was performed without complication] : visualization of the needle and placement of injection was performed without complication

## 2024-03-26 NOTE — DISCUSSION/SUMMARY
[de-identified] : The patient was advised of the diagnosis.  The natural history of the pathology was explained in full to the patient in layman's terms. All questions were answered.  The risks and benefits of surgical and non-surgical treatment alternatives were explained in full to the patient.

## 2024-03-26 NOTE — IMAGING
[de-identified] : Left knee: , no effusion.  Stable.  Medial tenderness>  Antalgic gait, no assistive devices.\par  \par  Back: Left paraspinal tenderness.  Decreased ROM.  NVI.  Neg SLR.  No pain with hip ROM.

## 2024-03-26 NOTE — ASSESSMENT
[FreeTextEntry1] : Previous doc: Right TKA 2017 10-70 ROM, had severe pain prior to surgery which started after an injection. Will get blood work to r/o infection. Left knee adv OA - will workup right knee first. 2/25/20: Blood work neg. Left knee OA - pain is worsening so will try inj today. Long discussion today regarding right rev TKA for arthrofibrosis vs left TKA - she is unsure how to proceed at this time so will see how she does after inj. 9/21/20: Left knee OA. Patient received cortisone injection in February with relief for 1 month. Patient is not ready for surgical treatment. Patient received gel injection in her right knee prior to tka with negative reaction. Patient will repeat cortisone injection left knee today. Right knee still with significant stiffness. Patient doesn't want to do a surgical revision at this time. Patient also complaining of lower back pain with radicular symptoms. Patient will begin pt for her left knee and Lspine. 11/3/20: Doing well at this time, cont PT and HEP, discussed repeat inj left knee in the future prn. Not interested in right rev TKA at this time. 6/1/21: Advanced OA in left knee but due to results in right knee patient would like to hold off on surgery. Patient had a history of a bad reaction of HA injection in left knee. Will repeat cortisone injection in left knee today. 11/2/21: Last cortisone inj not as successful as previous - zilretta inj today tolerated well. 5/17/22: Repeat zilretta left knee today tolerated well, not interested in TKA. 10/11/22: Repeat zilretta left knee tolerated well. 5/23/23: Left knee zilretta tolerated well. Lspine DDD causing new onset back pain - no neuro symptoms - PT and if no improvement will get MRI. 8/22/23: Repeat zilretta left knee tolerated well. 12/1/23: Worsening pain - left knee zilretta inj tolerated well and will get MRI Lspine eval nerve impingement and f/u with pain management for possible injections.  3/26/24: Zilretta last time helped 3 months then worsening pain - zilretta today tolerated well.

## 2024-03-26 NOTE — HISTORY OF PRESENT ILLNESS
[4] : 4 [7] : 7 [Dull/Aching] : dull/aching [de-identified] : 3/26/24: Left knee pain worsening - had 3 months relief from last zilretta inj.  Previous doc: Right TKA 12/21/17 by Dr. Snow always with pain and stiffness - before surgery had bone on bone OA with severe progressive pain and very poor ROM. Feels ROM is better than before surgery but still a lot of pain. Left knee with known OA but no treatments yet. Rght worse than left. No groin pain. Cannot take NSAIDs - 1 kidney. 2/25/20: Cont pain - left is worsening lately. Had blood work done. 9/21/20: Patient is complaining of left knee and buttock pain. Pain worsens with sitting. Patient received cortisone injection in February with significant relief for about 1 month. Patient is not ready for surgery. Patient would like to discuss injection. 11/3/20: Inj helped a lot and is now going to PT which is helping. 6/1/21: Pain still consistent in left knee 11/2/21: Left knee pain worsening - last inj in June helped but not as well as others. 5/17/22: Zilretta helped x 6 months, pain returned 2 weeks ago, no new injury. 10/11/22: Zilretta helped until very recently. 5/23/23: Left knee pain worsening, zilretta helped for a few months.  Also left sided low back pain - no radiating pain down the leg.  No groin pain.  NSAIDs not helping. 12/1/23: Last inj helped but pain returned recently.  Cont Lspine pain - did PT x 5 weeks, no relief. [FreeTextEntry5] : pain returned had injection

## 2024-06-12 ENCOUNTER — APPOINTMENT (OUTPATIENT)
Dept: PAIN MANAGEMENT | Facility: CLINIC | Age: 84
End: 2024-06-12
Payer: MEDICARE

## 2024-06-12 VITALS — BODY MASS INDEX: 23.16 KG/M2 | WEIGHT: 139 LBS | HEIGHT: 65 IN

## 2024-06-12 DIAGNOSIS — M46.1 SACROILIITIS, NOT ELSEWHERE CLASSIFIED: ICD-10-CM

## 2024-06-12 DIAGNOSIS — M47.816 SPONDYLOSIS W/OUT MYELOPATHY OR RADICULOPATHY, LUMBAR REGION: ICD-10-CM

## 2024-06-12 DIAGNOSIS — M79.18 MYALGIA, OTHER SITE: ICD-10-CM

## 2024-06-12 PROCEDURE — 20552 NJX 1/MLT TRIGGER POINT 1/2: CPT

## 2024-06-12 PROCEDURE — 99214 OFFICE O/P EST MOD 30 MIN: CPT | Mod: 25

## 2024-06-12 NOTE — DISCUSSION/SUMMARY
[de-identified] : LOLY ALICEA is a 83 year-old woman presenting for a RPV for a history of chronic low back pain.   Prior treatment: 12/29/2023: LEFT L5-S1 TFESI w/o significant relief Physical therapy x6 weeks Acetaminophen  Plan: 1) MRI lumbar spine images reviewed with the patient. Patient appears to have a left-sided foraminal disc herniation at L5-S1.  2) Schedule a LEFT SI joint injection w/o MAC. The procedure was explained to the patient in detail. Reviewed risks, benefits, and alternatives with the patient. Some risks discussed included temporary increase in pain, bleeding, infection, and side effects from steroids. The patient expressed understanding and would like to proceed.  3) TPI today in the left lumbar paraspinal region 4) Continue acetaminophen prn 5) Continue sertraline 50mg daily; denies AEs 6) Continue home exercise regimen 7) RTC post procedure

## 2024-06-12 NOTE — HISTORY OF PRESENT ILLNESS
[Lower back] : lower back [Buttock] : buttock [7] : 7 [Dull/Aching] : dull/aching [Constant] : constant [Household chores] : household chores [Leisure] : leisure [Sleep] : sleep [Rest] : rest [Injection therapy] : injection therapy [Nothing helps with pain getting better] : Nothing helps with pain getting better [Sitting] : sitting [Walking] : walking [5] : 5 [FreeTextEntry1] : 6/12/2024 LOLY ALICEA is a 84 year-old woman presenting for a RPV for a history of chronic low back pain. The patient continues to have an aching pain in the left sacroiliac joint and gluteal region. No radiation of the pain to the lower extremities. Pain is worse with increased activities, such as with gardening and bending over.   The patient states that average pain over the past week was 6/10 in severity. Mood: Patient has ongoing depression. No anxiety. She notes that her depression is not currently being treated.  Sleep: Patient notes occasional difficulty with sleep maintenance 2/2 pain.   3/13/2024: LOLY ALICEA is a 83 year-old woman presenting for a RPV for a history of chronic low back pain. The patient was started on meloxicam at the last visit. She does not note any significant improvement in her pain with this medication. She continues to note an aching, throbbing pain in the left low lumbosacral region. No radiation of the pain to the lower extremities. No focal numbness or weakness in the lower extremities. No bowel or bladder incontinence or saddle anesthesia. Pain is worse with sitting for long periods.  The patient states that average pain over the past week was 7/10 in severity. Mood: Patient has ongoing depression. No anxiety. She notes that her depression is not currently being treated.  Sleep: Patient notes occasional difficulty with sleep maintenance 2/2 pain.   1/19/2024: LOLY ALICEA is a 83 year-old woman presenting for a RPV for a history of chronic low back pain. The patient underwent a LEFT L5-S1 TFESI on 12/29/2023. The patient does not note significant improvement of her pain since the procedure. She continues to have significant pain in the left low lumbosacral region. No radiation of the pain to the left lower extremity anymore. Pain is worse with sitting for long periods.  The patient states that average pain over the past week was 5/10 in severity. Mood: Patient notes ongoing depressed mood. No anxiety.  Sleep: No difficulty with sleep initiation or maintenance at this time.   12/13/2023: LOLY ALICEA is a 83 year-old woman presenting for a NPV for a history of chronic low back pain. The patient notes having pain in the low back for 4 months. She tried PT for 6 weeks without improvement. She denies any history of trauma or focal injury. The patient notes having an aching pain in the left low lumbosacral region with radiation to the left gluteal region and posterior thigh. No focal numbness or weakness in the lower extremities. No bowel or bladder incontinence or saddle anesthesia. Pain is worse with standing and walking as well as with transitioning from sitting to standing.  The patient states that average pain over the past week was 10 /10 in severity. Mood: Patient notes mild depression. No anxiety.  Sleep: No difficulty with sleep, though the patient notes pain when changing positions.  [] : no [FreeTextEntry6] : MSUCLE SPASMS, TPI [FreeTextEntry7] : B/L LEGS  [de-identified] : getting out of the chair; injection [de-identified] : L MRI

## 2024-06-12 NOTE — PROCEDURE
[Trigger point 1-2 muscle groups] : trigger point 1-2 muscle groups [Left] : of the left [Lumbar paraspinal muscle] : lumbar paraspinal muscle [Pain] : pain [Alcohol] : alcohol [Ethyl Chloride sprayed topically] : ethyl chloride sprayed topically [___ cc    1%] : Lidocaine ~Vcc of 1%  [___ cc    10mg] : Triamcinolone (Kenalog) ~Vcc of 10 mg  [] : Patient tolerated procedure well [Call if redness, pain or fever occur] : call if redness, pain or fever occur [Previous OTC use and PT nontherapeutic] : patient has tried OTC's including aspirin, Ibuprofen, Aleve, etc or prescription NSAIDS, and/or exercises at home and/or physical therapy without satisfactory response [Risks, benefits, alternatives discussed / Verbal consent obtained] : the risks benefits, and alternatives have been discussed, and verbal consent was obtained Principal Discharge DX:	MDD (major depressive disorder)

## 2024-06-12 NOTE — PHYSICAL EXAM
[de-identified] : Gen: NAD Head: NC/AT Eyes: no glasses, no scleral icterus ENT: mucous membranes moist CV: RRR, S1 S2, no mrg Lungs: CTAB, nonlabored breathing Abd: soft, NT/ND Ext: full ROM in all extremities, no peripheral edema Back: +TTP in the left low lumbar facet region and left SI joint; +DAKOTAH/gaenslen's/yeoman's on the LEFT Neuro: CN intact LEs +5 L +5 R hip flexion +5 L +5 R leg extension +5 L +5 R leg flexion +5 L +5 R foot dorsiflexion +5 L +5 R foot plantarflexion +5 L +5 R EHL extension Psych: normal affect Skin: no visible lesions

## 2024-07-11 ENCOUNTER — APPOINTMENT (OUTPATIENT)
Dept: PAIN MANAGEMENT | Facility: CLINIC | Age: 84
End: 2024-07-11

## 2024-07-23 ENCOUNTER — APPOINTMENT (OUTPATIENT)
Dept: ORTHOPEDIC SURGERY | Facility: CLINIC | Age: 84
End: 2024-07-23
Payer: MEDICARE

## 2024-07-23 DIAGNOSIS — M17.12 UNILATERAL PRIMARY OSTEOARTHRITIS, LEFT KNEE: ICD-10-CM

## 2024-07-23 PROCEDURE — 20610 DRAIN/INJ JOINT/BURSA W/O US: CPT | Mod: LT

## 2024-07-23 PROCEDURE — 99214 OFFICE O/P EST MOD 30 MIN: CPT | Mod: 25

## 2024-07-23 PROCEDURE — J3490M: CUSTOM | Mod: NC,JZ

## 2024-07-23 NOTE — DISCUSSION/SUMMARY
[de-identified] : The patient was advised of the diagnosis.  The natural history of the pathology was explained in full to the patient in layman's terms. All questions were answered.  The risks and benefits of surgical and non-surgical treatment alternatives were explained in full to the patient.  I saw the patient under the supervision of Dr. Arriaga and followed his plan of care.

## 2024-07-23 NOTE — ASSESSMENT
[FreeTextEntry1] : Previous doc: Right TKA 2017 10-70 ROM, had severe pain prior to surgery which started after an injection. Will get blood work to r/o infection. Left knee adv OA - will workup right knee first. 2/25/20: Blood work neg. Left knee OA - pain is worsening so will try inj today. Long discussion today regarding right rev TKA for arthrofibrosis vs left TKA - she is unsure how to proceed at this time so will see how she does after inj. 9/21/20: Left knee OA. Patient received cortisone injection in February with relief for 1 month. Patient is not ready for surgical treatment. Patient received gel injection in her right knee prior to tka with negative reaction. Patient will repeat cortisone injection left knee today. Right knee still with significant stiffness. Patient doesn't want to do a surgical revision at this time. Patient also complaining of lower back pain with radicular symptoms. Patient will begin pt for her left knee and Lspine. 11/3/20: Doing well at this time, cont PT and HEP, discussed repeat inj left knee in the future prn. Not interested in right rev TKA at this time. 6/1/21: Advanced OA in left knee but due to results in right knee patient would like to hold off on surgery. Patient had a history of a bad reaction of HA injection in left knee. Will repeat cortisone injection in left knee today. 11/2/21: Last cortisone inj not as successful as previous - zilretta inj today tolerated well. 5/17/22: Repeat zilretta left knee today tolerated well, not interested in TKA. 10/11/22: Repeat zilretta left knee tolerated well. 5/23/23: Left knee zilretta tolerated well. Lspine DDD causing new onset back pain - no neuro symptoms - PT and if no improvement will get MRI. 8/22/23: Repeat zilretta left knee tolerated well. 12/1/23: Worsening pain - left knee zilretta inj tolerated well and will get MRI Lspine eval nerve impingement and f/u with pain management for possible injections. 3/26/24: Zilretta last time helped 3 months then worsening pain - zilretta today tolerated well.  7/23/24: Worsening pain recently, will try zilretta again today.  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

## 2024-07-23 NOTE — IMAGING
[de-identified] : Left knee: , no effusion.  Stable.  Medial tenderness>  Antalgic gait, no assistive devices.\par  \par  Back: Left paraspinal tenderness.  Decreased ROM.  NVI.  Neg SLR.  No pain with hip ROM.

## 2024-07-23 NOTE — PROCEDURE
[Large Joint Injection] : Large joint injection [Left] : of the left [Pain] : pain [Inflammation] : inflammation [X-ray evidence of Osteoarthritis on this or prior visit] : x-ray evidence of Osteoarthritis on this or prior visit [Betadine] : betadine [Ethyl Chloride sprayed topically] : ethyl chloride sprayed topically [Sterile technique used] : sterile technique used [___ cc    32 units 5mg] : Zilretta ~Vcc of 32 units 5 mg  [] : Patient tolerated procedure well [Call if redness, pain or fever occur] : call if redness, pain or fever occur [Apply ice for 15min out of every hour for the next 12-24 hours as tolerated] : apply ice for 15 minutes out of every hour for the next 12-24 hours as tolerated [Previous OTC use and PT nontherapeutic] : patient has tried OTC's including aspirin, Ibuprofen, Aleve, etc or prescription NSAIDS, and/or exercises at home and/or physical therapy without satisfactory response [Patient had decreased mobility in the joint] : patient had decreased mobility in the joint [Risks, benefits, alternatives discussed / Verbal consent obtained] : the risks benefits, and alternatives have been discussed, and verbal consent was obtained [All ultrasound images have been permanently captured and stored accordingly in our picture archiving and communication system] : All ultrasound images have been permanently captured and stored accordingly in our picture archiving and communication system [Visualization of the needle and placement of injection was performed without complication] : visualization of the needle and placement of injection was performed without complication

## 2024-07-23 NOTE — HISTORY OF PRESENT ILLNESS
[de-identified] : 7/23/24: Worsening pain, last inj helped a lot until recently.  Previous doc: Right TKA 12/21/17 by Dr. Snow always with pain and stiffness - before surgery had bone on bone OA with severe progressive pain and very poor ROM. Feels ROM is better than before surgery but still a lot of pain. Left knee with known OA but no treatments yet. Rght worse than left. No groin pain. Cannot take NSAIDs - 1 kidney. 2/25/20: Cont pain - left is worsening lately. Had blood work done. 9/21/20: Patient is complaining of left knee and buttock pain. Pain worsens with sitting. Patient received cortisone injection in February with significant relief for about 1 month. Patient is not ready for surgery. Patient would like to discuss injection. 11/3/20: Inj helped a lot and is now going to PT which is helping. 6/1/21: Pain still consistent in left knee 11/2/21: Left knee pain worsening - last inj in June helped but not as well as others. 5/17/22: Zilretta helped x 6 months, pain returned 2 weeks ago, no new injury. 10/11/22: Zilretta helped until very recently. 5/23/23: Left knee pain worsening, zilretta helped for a few months.  Also left sided low back pain - no radiating pain down the leg.  No groin pain.  NSAIDs not helping. 12/1/23: Last inj helped but pain returned recently.  Cont Lspine pain - did PT x 5 weeks, no relief. 3/26/24: Left knee pain worsening - had 3 months relief from last zilretta inj.

## 2024-07-24 ENCOUNTER — APPOINTMENT (OUTPATIENT)
Dept: PAIN MANAGEMENT | Facility: CLINIC | Age: 84
End: 2024-07-24

## 2024-11-22 ENCOUNTER — APPOINTMENT (OUTPATIENT)
Dept: ORTHOPEDIC SURGERY | Facility: CLINIC | Age: 84
End: 2024-11-22
Payer: MEDICARE

## 2024-11-22 DIAGNOSIS — M17.12 UNILATERAL PRIMARY OSTEOARTHRITIS, LEFT KNEE: ICD-10-CM

## 2024-11-22 PROCEDURE — 99213 OFFICE O/P EST LOW 20 MIN: CPT | Mod: 25

## 2024-11-22 PROCEDURE — 20610 DRAIN/INJ JOINT/BURSA W/O US: CPT | Mod: LT

## 2025-04-01 ENCOUNTER — APPOINTMENT (OUTPATIENT)
Dept: ORTHOPEDIC SURGERY | Facility: CLINIC | Age: 85
End: 2025-04-01
Payer: MEDICARE

## 2025-04-01 DIAGNOSIS — M17.12 UNILATERAL PRIMARY OSTEOARTHRITIS, LEFT KNEE: ICD-10-CM

## 2025-04-01 PROCEDURE — 99213 OFFICE O/P EST LOW 20 MIN: CPT | Mod: 25

## 2025-04-01 PROCEDURE — 20610 DRAIN/INJ JOINT/BURSA W/O US: CPT | Mod: LT

## 2025-07-22 ENCOUNTER — APPOINTMENT (OUTPATIENT)
Dept: ORTHOPEDIC SURGERY | Facility: CLINIC | Age: 85
End: 2025-07-22
Payer: MEDICARE

## 2025-07-22 VITALS — WEIGHT: 130 LBS | BODY MASS INDEX: 21.66 KG/M2 | HEIGHT: 65 IN

## 2025-07-22 DIAGNOSIS — M17.12 UNILATERAL PRIMARY OSTEOARTHRITIS, LEFT KNEE: ICD-10-CM

## 2025-07-22 PROCEDURE — 99213 OFFICE O/P EST LOW 20 MIN: CPT | Mod: 25

## 2025-07-22 PROCEDURE — 20611 DRAIN/INJ JOINT/BURSA W/US: CPT | Mod: LT
